# Patient Record
Sex: FEMALE | Race: WHITE | NOT HISPANIC OR LATINO | URBAN - METROPOLITAN AREA
[De-identification: names, ages, dates, MRNs, and addresses within clinical notes are randomized per-mention and may not be internally consistent; named-entity substitution may affect disease eponyms.]

---

## 2024-01-01 ENCOUNTER — INPATIENT (INPATIENT)
Facility: HOSPITAL | Age: 69
LOS: 3 days | Discharge: HOME | DRG: 871 | End: 2024-01-05
Attending: STUDENT IN AN ORGANIZED HEALTH CARE EDUCATION/TRAINING PROGRAM | Admitting: FAMILY MEDICINE
Payer: COMMERCIAL

## 2024-01-01 VITALS
SYSTOLIC BLOOD PRESSURE: 171 MMHG | RESPIRATION RATE: 24 BRPM | OXYGEN SATURATION: 88 % | WEIGHT: 149.91 LBS | TEMPERATURE: 101 F | DIASTOLIC BLOOD PRESSURE: 78 MMHG | HEART RATE: 100 BPM

## 2024-01-01 DIAGNOSIS — Z98.890 OTHER SPECIFIED POSTPROCEDURAL STATES: Chronic | ICD-10-CM

## 2024-01-01 DIAGNOSIS — J96.21 ACUTE AND CHRONIC RESPIRATORY FAILURE WITH HYPOXIA: ICD-10-CM

## 2024-01-01 LAB
ALBUMIN SERPL ELPH-MCNC: 4.2 G/DL — SIGNIFICANT CHANGE UP (ref 3.5–5.2)
ALBUMIN SERPL ELPH-MCNC: 4.2 G/DL — SIGNIFICANT CHANGE UP (ref 3.5–5.2)
ALP SERPL-CCNC: 66 U/L — SIGNIFICANT CHANGE UP (ref 30–115)
ALP SERPL-CCNC: 66 U/L — SIGNIFICANT CHANGE UP (ref 30–115)
ALT FLD-CCNC: 21 U/L — SIGNIFICANT CHANGE UP (ref 0–41)
ALT FLD-CCNC: 21 U/L — SIGNIFICANT CHANGE UP (ref 0–41)
ANION GAP SERPL CALC-SCNC: 12 MMOL/L — SIGNIFICANT CHANGE UP (ref 7–14)
ANION GAP SERPL CALC-SCNC: 12 MMOL/L — SIGNIFICANT CHANGE UP (ref 7–14)
AST SERPL-CCNC: 32 U/L — SIGNIFICANT CHANGE UP (ref 0–41)
AST SERPL-CCNC: 32 U/L — SIGNIFICANT CHANGE UP (ref 0–41)
BASE EXCESS BLDV CALC-SCNC: 4.9 MMOL/L — HIGH (ref -2–3)
BASE EXCESS BLDV CALC-SCNC: 4.9 MMOL/L — HIGH (ref -2–3)
BASOPHILS # BLD AUTO: 0.03 K/UL — SIGNIFICANT CHANGE UP (ref 0–0.2)
BASOPHILS # BLD AUTO: 0.03 K/UL — SIGNIFICANT CHANGE UP (ref 0–0.2)
BASOPHILS NFR BLD AUTO: 0.3 % — SIGNIFICANT CHANGE UP (ref 0–1)
BASOPHILS NFR BLD AUTO: 0.3 % — SIGNIFICANT CHANGE UP (ref 0–1)
BILIRUB SERPL-MCNC: 0.5 MG/DL — SIGNIFICANT CHANGE UP (ref 0.2–1.2)
BILIRUB SERPL-MCNC: 0.5 MG/DL — SIGNIFICANT CHANGE UP (ref 0.2–1.2)
BUN SERPL-MCNC: 37 MG/DL — HIGH (ref 10–20)
BUN SERPL-MCNC: 37 MG/DL — HIGH (ref 10–20)
CA-I SERPL-SCNC: 1.01 MMOL/L — LOW (ref 1.15–1.33)
CA-I SERPL-SCNC: 1.01 MMOL/L — LOW (ref 1.15–1.33)
CALCIUM SERPL-MCNC: 8.3 MG/DL — LOW (ref 8.4–10.5)
CALCIUM SERPL-MCNC: 8.3 MG/DL — LOW (ref 8.4–10.5)
CHLORIDE SERPL-SCNC: 88 MMOL/L — LOW (ref 98–110)
CHLORIDE SERPL-SCNC: 88 MMOL/L — LOW (ref 98–110)
CO2 SERPL-SCNC: 31 MMOL/L — SIGNIFICANT CHANGE UP (ref 17–32)
CO2 SERPL-SCNC: 31 MMOL/L — SIGNIFICANT CHANGE UP (ref 17–32)
CREAT SERPL-MCNC: 0.9 MG/DL — SIGNIFICANT CHANGE UP (ref 0.7–1.5)
CREAT SERPL-MCNC: 0.9 MG/DL — SIGNIFICANT CHANGE UP (ref 0.7–1.5)
EGFR: 69 ML/MIN/1.73M2 — SIGNIFICANT CHANGE UP
EGFR: 69 ML/MIN/1.73M2 — SIGNIFICANT CHANGE UP
EOSINOPHIL # BLD AUTO: 0 K/UL — SIGNIFICANT CHANGE UP (ref 0–0.7)
EOSINOPHIL # BLD AUTO: 0 K/UL — SIGNIFICANT CHANGE UP (ref 0–0.7)
EOSINOPHIL NFR BLD AUTO: 0 % — SIGNIFICANT CHANGE UP (ref 0–8)
EOSINOPHIL NFR BLD AUTO: 0 % — SIGNIFICANT CHANGE UP (ref 0–8)
FLUAV AG NPH QL: DETECTED
FLUAV AG NPH QL: DETECTED
FLUBV AG NPH QL: SIGNIFICANT CHANGE UP
FLUBV AG NPH QL: SIGNIFICANT CHANGE UP
GAS PNL BLDV: 129 MMOL/L — LOW (ref 136–145)
GAS PNL BLDV: 129 MMOL/L — LOW (ref 136–145)
GAS PNL BLDV: SIGNIFICANT CHANGE UP
GAS PNL BLDV: SIGNIFICANT CHANGE UP
GLUCOSE SERPL-MCNC: 106 MG/DL — HIGH (ref 70–99)
GLUCOSE SERPL-MCNC: 106 MG/DL — HIGH (ref 70–99)
HCO3 BLDV-SCNC: 34 MMOL/L — HIGH (ref 22–29)
HCO3 BLDV-SCNC: 34 MMOL/L — HIGH (ref 22–29)
HCT VFR BLD CALC: 44.3 % — SIGNIFICANT CHANGE UP (ref 37–47)
HCT VFR BLD CALC: 44.3 % — SIGNIFICANT CHANGE UP (ref 37–47)
HCT VFR BLDA CALC: 47 % — HIGH (ref 34.5–46.5)
HCT VFR BLDA CALC: 47 % — HIGH (ref 34.5–46.5)
HGB BLD CALC-MCNC: 15.5 G/DL — SIGNIFICANT CHANGE UP (ref 11.7–16.1)
HGB BLD CALC-MCNC: 15.5 G/DL — SIGNIFICANT CHANGE UP (ref 11.7–16.1)
HGB BLD-MCNC: 15.2 G/DL — SIGNIFICANT CHANGE UP (ref 12–16)
HGB BLD-MCNC: 15.2 G/DL — SIGNIFICANT CHANGE UP (ref 12–16)
IMM GRANULOCYTES NFR BLD AUTO: 0.2 % — SIGNIFICANT CHANGE UP (ref 0.1–0.3)
IMM GRANULOCYTES NFR BLD AUTO: 0.2 % — SIGNIFICANT CHANGE UP (ref 0.1–0.3)
LACTATE BLDV-MCNC: 1.5 MMOL/L — SIGNIFICANT CHANGE UP (ref 0.5–2)
LACTATE BLDV-MCNC: 1.5 MMOL/L — SIGNIFICANT CHANGE UP (ref 0.5–2)
LYMPHOCYTES # BLD AUTO: 1.15 K/UL — LOW (ref 1.2–3.4)
LYMPHOCYTES # BLD AUTO: 1.15 K/UL — LOW (ref 1.2–3.4)
LYMPHOCYTES # BLD AUTO: 13.3 % — LOW (ref 20.5–51.1)
LYMPHOCYTES # BLD AUTO: 13.3 % — LOW (ref 20.5–51.1)
MCHC RBC-ENTMCNC: 28.6 PG — SIGNIFICANT CHANGE UP (ref 27–31)
MCHC RBC-ENTMCNC: 28.6 PG — SIGNIFICANT CHANGE UP (ref 27–31)
MCHC RBC-ENTMCNC: 34.3 G/DL — SIGNIFICANT CHANGE UP (ref 32–37)
MCHC RBC-ENTMCNC: 34.3 G/DL — SIGNIFICANT CHANGE UP (ref 32–37)
MCV RBC AUTO: 83.3 FL — SIGNIFICANT CHANGE UP (ref 81–99)
MCV RBC AUTO: 83.3 FL — SIGNIFICANT CHANGE UP (ref 81–99)
MONOCYTES # BLD AUTO: 1.08 K/UL — HIGH (ref 0.1–0.6)
MONOCYTES # BLD AUTO: 1.08 K/UL — HIGH (ref 0.1–0.6)
MONOCYTES NFR BLD AUTO: 12.5 % — HIGH (ref 1.7–9.3)
MONOCYTES NFR BLD AUTO: 12.5 % — HIGH (ref 1.7–9.3)
NEUTROPHILS # BLD AUTO: 6.35 K/UL — SIGNIFICANT CHANGE UP (ref 1.4–6.5)
NEUTROPHILS # BLD AUTO: 6.35 K/UL — SIGNIFICANT CHANGE UP (ref 1.4–6.5)
NEUTROPHILS NFR BLD AUTO: 73.7 % — SIGNIFICANT CHANGE UP (ref 42.2–75.2)
NEUTROPHILS NFR BLD AUTO: 73.7 % — SIGNIFICANT CHANGE UP (ref 42.2–75.2)
NRBC # BLD: 0 /100 WBCS — SIGNIFICANT CHANGE UP (ref 0–0)
NRBC # BLD: 0 /100 WBCS — SIGNIFICANT CHANGE UP (ref 0–0)
PCO2 BLDV: 69 MMHG — HIGH (ref 39–42)
PCO2 BLDV: 69 MMHG — HIGH (ref 39–42)
PH BLDV: 7.3 — LOW (ref 7.32–7.43)
PH BLDV: 7.3 — LOW (ref 7.32–7.43)
PLATELET # BLD AUTO: 201 K/UL — SIGNIFICANT CHANGE UP (ref 130–400)
PLATELET # BLD AUTO: 201 K/UL — SIGNIFICANT CHANGE UP (ref 130–400)
PMV BLD: 10.4 FL — SIGNIFICANT CHANGE UP (ref 7.4–10.4)
PMV BLD: 10.4 FL — SIGNIFICANT CHANGE UP (ref 7.4–10.4)
PO2 BLDV: 66 MMHG — HIGH (ref 25–45)
PO2 BLDV: 66 MMHG — HIGH (ref 25–45)
POTASSIUM BLDV-SCNC: 4.9 MMOL/L — SIGNIFICANT CHANGE UP (ref 3.5–5.1)
POTASSIUM BLDV-SCNC: 4.9 MMOL/L — SIGNIFICANT CHANGE UP (ref 3.5–5.1)
POTASSIUM SERPL-MCNC: 4.6 MMOL/L — SIGNIFICANT CHANGE UP (ref 3.5–5)
POTASSIUM SERPL-MCNC: 4.6 MMOL/L — SIGNIFICANT CHANGE UP (ref 3.5–5)
POTASSIUM SERPL-SCNC: 4.6 MMOL/L — SIGNIFICANT CHANGE UP (ref 3.5–5)
POTASSIUM SERPL-SCNC: 4.6 MMOL/L — SIGNIFICANT CHANGE UP (ref 3.5–5)
PROT SERPL-MCNC: 7 G/DL — SIGNIFICANT CHANGE UP (ref 6–8)
PROT SERPL-MCNC: 7 G/DL — SIGNIFICANT CHANGE UP (ref 6–8)
RBC # BLD: 5.32 M/UL — SIGNIFICANT CHANGE UP (ref 4.2–5.4)
RBC # BLD: 5.32 M/UL — SIGNIFICANT CHANGE UP (ref 4.2–5.4)
RBC # FLD: 13.1 % — SIGNIFICANT CHANGE UP (ref 11.5–14.5)
RBC # FLD: 13.1 % — SIGNIFICANT CHANGE UP (ref 11.5–14.5)
RSV RNA NPH QL NAA+NON-PROBE: SIGNIFICANT CHANGE UP
RSV RNA NPH QL NAA+NON-PROBE: SIGNIFICANT CHANGE UP
SAO2 % BLDV: 90.9 % — HIGH (ref 67–88)
SAO2 % BLDV: 90.9 % — HIGH (ref 67–88)
SARS-COV-2 RNA SPEC QL NAA+PROBE: SIGNIFICANT CHANGE UP
SARS-COV-2 RNA SPEC QL NAA+PROBE: SIGNIFICANT CHANGE UP
SODIUM SERPL-SCNC: 131 MMOL/L — LOW (ref 135–146)
SODIUM SERPL-SCNC: 131 MMOL/L — LOW (ref 135–146)
WBC # BLD: 8.63 K/UL — SIGNIFICANT CHANGE UP (ref 4.8–10.8)
WBC # BLD: 8.63 K/UL — SIGNIFICANT CHANGE UP (ref 4.8–10.8)
WBC # FLD AUTO: 8.63 K/UL — SIGNIFICANT CHANGE UP (ref 4.8–10.8)
WBC # FLD AUTO: 8.63 K/UL — SIGNIFICANT CHANGE UP (ref 4.8–10.8)

## 2024-01-01 PROCEDURE — 85027 COMPLETE CBC AUTOMATED: CPT

## 2024-01-01 PROCEDURE — 99285 EMERGENCY DEPT VISIT HI MDM: CPT

## 2024-01-01 PROCEDURE — 94640 AIRWAY INHALATION TREATMENT: CPT

## 2024-01-01 PROCEDURE — 36415 COLL VENOUS BLD VENIPUNCTURE: CPT

## 2024-01-01 PROCEDURE — 84100 ASSAY OF PHOSPHORUS: CPT

## 2024-01-01 PROCEDURE — 83735 ASSAY OF MAGNESIUM: CPT

## 2024-01-01 PROCEDURE — 80053 COMPREHEN METABOLIC PANEL: CPT

## 2024-01-01 PROCEDURE — 86803 HEPATITIS C AB TEST: CPT

## 2024-01-01 PROCEDURE — 82962 GLUCOSE BLOOD TEST: CPT

## 2024-01-01 PROCEDURE — 71045 X-RAY EXAM CHEST 1 VIEW: CPT | Mod: 26

## 2024-01-01 PROCEDURE — 94660 CPAP INITIATION&MGMT: CPT

## 2024-01-01 PROCEDURE — 82803 BLOOD GASES ANY COMBINATION: CPT

## 2024-01-01 PROCEDURE — 83605 ASSAY OF LACTIC ACID: CPT

## 2024-01-01 PROCEDURE — 99222 1ST HOSP IP/OBS MODERATE 55: CPT

## 2024-01-01 PROCEDURE — 80048 BASIC METABOLIC PNL TOTAL CA: CPT

## 2024-01-01 PROCEDURE — 85025 COMPLETE CBC W/AUTO DIFF WBC: CPT

## 2024-01-01 PROCEDURE — 71275 CT ANGIOGRAPHY CHEST: CPT

## 2024-01-01 PROCEDURE — 94760 N-INVAS EAR/PLS OXIMETRY 1: CPT

## 2024-01-01 PROCEDURE — 84145 PROCALCITONIN (PCT): CPT

## 2024-01-01 RX ORDER — ONDANSETRON 8 MG/1
4 TABLET, FILM COATED ORAL EVERY 6 HOURS
Refills: 0 | Status: DISCONTINUED | OUTPATIENT
Start: 2024-01-01 | End: 2024-01-05

## 2024-01-01 RX ORDER — IPRATROPIUM/ALBUTEROL SULFATE 18-103MCG
3 AEROSOL WITH ADAPTER (GRAM) INHALATION EVERY 6 HOURS
Refills: 0 | Status: DISCONTINUED | OUTPATIENT
Start: 2024-01-01 | End: 2024-01-05

## 2024-01-01 RX ORDER — ACETAMINOPHEN 500 MG
975 TABLET ORAL ONCE
Refills: 0 | Status: COMPLETED | OUTPATIENT
Start: 2024-01-01 | End: 2024-01-01

## 2024-01-01 RX ORDER — ACETAMINOPHEN 500 MG
650 TABLET ORAL EVERY 6 HOURS
Refills: 0 | Status: DISCONTINUED | OUTPATIENT
Start: 2024-01-01 | End: 2024-01-05

## 2024-01-01 RX ORDER — IPRATROPIUM/ALBUTEROL SULFATE 18-103MCG
3 AEROSOL WITH ADAPTER (GRAM) INHALATION
Refills: 0 | Status: COMPLETED | OUTPATIENT
Start: 2024-01-01 | End: 2024-01-01

## 2024-01-01 RX ORDER — BUDESONIDE AND FORMOTEROL FUMARATE DIHYDRATE 160; 4.5 UG/1; UG/1
2 AEROSOL RESPIRATORY (INHALATION)
Refills: 0 | Status: DISCONTINUED | OUTPATIENT
Start: 2024-01-01 | End: 2024-01-05

## 2024-01-01 RX ORDER — HYDROCORTISONE 20 MG
40 TABLET ORAL EVERY 8 HOURS
Refills: 0 | Status: DISCONTINUED | OUTPATIENT
Start: 2024-01-01 | End: 2024-01-01

## 2024-01-01 RX ORDER — CEFTRIAXONE 500 MG/1
1000 INJECTION, POWDER, FOR SOLUTION INTRAMUSCULAR; INTRAVENOUS EVERY 24 HOURS
Refills: 0 | Status: DISCONTINUED | OUTPATIENT
Start: 2024-01-01 | End: 2024-01-02

## 2024-01-01 RX ORDER — NICOTINE POLACRILEX 2 MG
21 GUM BUCCAL DAILY
Refills: 0 | Status: DISCONTINUED | OUTPATIENT
Start: 2024-01-01 | End: 2024-01-05

## 2024-01-01 RX ORDER — PANTOPRAZOLE SODIUM 20 MG/1
40 TABLET, DELAYED RELEASE ORAL
Refills: 0 | Status: DISCONTINUED | OUTPATIENT
Start: 2024-01-01 | End: 2024-01-05

## 2024-01-01 RX ORDER — CEFTRIAXONE 500 MG/1
1000 INJECTION, POWDER, FOR SOLUTION INTRAMUSCULAR; INTRAVENOUS ONCE
Refills: 0 | Status: COMPLETED | OUTPATIENT
Start: 2024-01-01 | End: 2024-01-01

## 2024-01-01 RX ORDER — HEPARIN SODIUM 5000 [USP'U]/ML
5000 INJECTION INTRAVENOUS; SUBCUTANEOUS EVERY 12 HOURS
Refills: 0 | Status: DISCONTINUED | OUTPATIENT
Start: 2024-01-01 | End: 2024-01-02

## 2024-01-01 RX ORDER — LACTOBACILLUS ACIDOPHILUS 100MM CELL
1 CAPSULE ORAL
Refills: 0 | Status: DISCONTINUED | OUTPATIENT
Start: 2024-01-01 | End: 2024-01-05

## 2024-01-01 RX ORDER — SODIUM CHLORIDE 9 MG/ML
1000 INJECTION INTRAMUSCULAR; INTRAVENOUS; SUBCUTANEOUS ONCE
Refills: 0 | Status: COMPLETED | OUTPATIENT
Start: 2024-01-01 | End: 2024-01-01

## 2024-01-01 RX ORDER — ALBUTEROL 90 UG/1
2 AEROSOL, METERED ORAL EVERY 4 HOURS
Refills: 0 | Status: DISCONTINUED | OUTPATIENT
Start: 2024-01-01 | End: 2024-01-05

## 2024-01-01 RX ORDER — SENNA PLUS 8.6 MG/1
2 TABLET ORAL AT BEDTIME
Refills: 0 | Status: DISCONTINUED | OUTPATIENT
Start: 2024-01-01 | End: 2024-01-05

## 2024-01-01 RX ORDER — AZITHROMYCIN 500 MG/1
500 TABLET, FILM COATED ORAL ONCE
Refills: 0 | Status: COMPLETED | OUTPATIENT
Start: 2024-01-01 | End: 2024-01-01

## 2024-01-01 RX ORDER — AZITHROMYCIN 500 MG/1
500 TABLET, FILM COATED ORAL EVERY 24 HOURS
Refills: 0 | Status: DISCONTINUED | OUTPATIENT
Start: 2024-01-01 | End: 2024-01-04

## 2024-01-01 RX ADMIN — CEFTRIAXONE 100 MILLIGRAM(S): 500 INJECTION, POWDER, FOR SOLUTION INTRAMUSCULAR; INTRAVENOUS at 21:02

## 2024-01-01 RX ADMIN — Medication 3 MILLILITER(S): at 22:39

## 2024-01-01 RX ADMIN — SODIUM CHLORIDE 1000 MILLILITER(S): 9 INJECTION INTRAMUSCULAR; INTRAVENOUS; SUBCUTANEOUS at 21:00

## 2024-01-01 RX ADMIN — Medication 3 MILLILITER(S): at 22:11

## 2024-01-01 RX ADMIN — Medication 975 MILLIGRAM(S): at 21:00

## 2024-01-01 RX ADMIN — Medication 75 MILLIGRAM(S): at 23:58

## 2024-01-01 RX ADMIN — Medication 3 MILLILITER(S): at 21:03

## 2024-01-01 RX ADMIN — Medication 125 MILLIGRAM(S): at 21:02

## 2024-01-01 RX ADMIN — AZITHROMYCIN 255 MILLIGRAM(S): 500 TABLET, FILM COATED ORAL at 21:03

## 2024-01-01 NOTE — ED PROVIDER NOTE - NS ED ATTENDING STATEMENT MOD
This was a shared visit with the DIXIE. I reviewed and verified the documentation and independently performed the documented: This was a shared visit with the DIXIE. I reviewed and verified the documentation.

## 2024-01-01 NOTE — ED PROVIDER NOTE - CARE PLAN
1 Principal Discharge DX:	Acute on chronic respiratory failure with hypoxia and hypercapnia  Secondary Diagnosis:	COPD exacerbation  Secondary Diagnosis:	Influenza A

## 2024-01-01 NOTE — ED PROVIDER NOTE - NS_EDPROVIDERDISPOUSERTYPE_ED_A_ED
In an effort to ensure that our patients [facility], a Team Member has reviewed your chart and identified an opportunity to provide the best care possible. An attempt was made to discuss or schedule overdue Preventive or Disease Management screening.     The Outcome was Contact was not made, left messageIf you have any questions or need help with scheduling, contact our Health Outreach Team at [phone]. Care Gaps include Wellness Visits.       Attending Attestation (For Attendings USE Only)...

## 2024-01-01 NOTE — H&P ADULT - NSHPPHYSICALEXAM_GEN_ALL_CORE
Vital Signs Last 24 Hrs  T(C): 36.8 (01 Jan 2024 22:46), Max: 38.5 (01 Jan 2024 19:44)  T(F): 98.3 (01 Jan 2024 22:46), Max: 101.3 (01 Jan 2024 19:44)  HR: 90 (01 Jan 2024 22:46) (90 - 100)  BP: 117/56 (01 Jan 2024 22:46) (117/56 - 171/78)  BP(mean): --  RR: 22 (01 Jan 2024 22:12) (22 - 24)  SpO2: 94% (01 Jan 2024 22:46) (88% - 96%)    Parameters below as of 01 Jan 2024 22:46  Patient On (Oxygen Delivery Method): nasal cannula  O2 Flow (L/min): 4    PHYSICAL EXAM-  GENERAL: NAD,    HEAD:  Atraumatic, Normocephalic  EYES: EOMI, PERRLA, conjunctiva and sclera clear  NECK: Supple, No JVD, Normal thyroid  NERVOUS SYSTEM:  Alert & Oriented X3, Motor Strength 5/5 B/L upper and lower extremities; DTRs 2+ intact and symmetric  CHEST/LUNG: + rhonchi with wheezing with decrease air entry   HEART: Regular rate and rhythm; No murmurs, rubs, or gallops  ABDOMEN: Soft, Nontender, Nondistended; Bowel sounds present  EXTREMITIES:  2+ Peripheral Pulses, No clubbing, cyanosis, or edema  SKIN: No rashes or lesions

## 2024-01-01 NOTE — H&P ADULT - ASSESSMENT
Patient is 67 yo female with long term smoking, has not seen a physician for many years presenting with       #COPD exacerbation  #Flu  #Tob usage  #recent travel  -CXR shows possible infiltrate  #acute hypoxic, and mild hypercarbic respiratory failure  -Will start patient on tamiflu, duoneb Advair neb tx, albuterol inhaler PRN, IV antibiotic, and IV steriod  -Chest CTA to r/o pe   -Nicotine patch  -Pulmonary, and ID consult  -Counselled about smoking  -MRSA, legionella, and strep antigen  -Oxygen  -Counselled      #Progress Note Handoff  Pending (specify):  as above   Family discussion:  plan of care was discussed with patient  in details.  all questions were answered.  seems to understand, and in agreement  Disposition:  Home    Patient is 69 yo female with long term smoking, has not seen a physician for many years presenting with       #COPD exacerbation  #Flu  #Tob usage  #recent travel  -CXR shows possible infiltrate  #acute hypoxic, and mild hypercarbic respiratory failure  -Will start patient on tamiflu, duoneb Advair neb tx, albuterol inhaler PRN, IV antibiotic, and IV steriod  -Chest CTA to r/o pe   -Nicotine patch  -Pulmonary, and ID consult  -Counselled about smoking  -MRSA, legionella, and strep antigen  -Oxygen  -Counselled      #Progress Note Handoff  Pending (specify):  as above   Family discussion:  plan of care was discussed with patient  in details.  all questions were answered.  seems to understand, and in agreement  Disposition:  Home

## 2024-01-01 NOTE — ED PROVIDER NOTE - OBJECTIVE STATEMENT
68 years old female history of heavy smoking presents complaint of worsening shortness of breath and coughing over the past week.  Reports her entire family was sick during [place] celebration.  She started having coughing and body aches with fever chills over the past week.  Having more shortness of breath tonight so son called EMS.  Patient otherwise denies headache, dizziness, chest pain, abdominal pain, vomiting, diarrhea or urinary symptoms.    Reports she have not seen a doctor over this through the years.

## 2024-01-01 NOTE — ED PROVIDER NOTE - CLINICAL SUMMARY MEDICAL DECISION MAKING FREE TEXT BOX
68yF  no pmhx Does not follow-up with primary doctor in over 10 years positive smoker presents with fever cough abdominal positive sick contacts with allergies worsening shortness of breath.  Family son at bedside notes pursed breathing and wheezing x 2 years however worse since yesterday 68yF  no pmhx Does not follow-up with primary doctor in over 10 years positive smoker presents with fever cough abdominal positive sick contacts with allergies worsening shortness of breath.  Family son at bedside notes pursed breathing and wheezing x 2 years however worse since yesterday.  in ED  wheezing  bl  hypoxic   nebs steroids  given ,  and  cap  abx   Pt  found to  be Flu A positive   doing well on NC.  admitted

## 2024-01-01 NOTE — ED PROVIDER NOTE - PHYSICAL EXAMINATION
CONSTITUTIONAL: Well-appearing; in no apparent distress.   EYES: PERRL; EOM intact.   CARDIOVASCULAR: Normal S1, S2; no murmurs, rubs, or gallops.   RESPIRATORY: Respiratory rate 22.  Mild accessory muscle use.  Speaking 2-3 word sentences.  Bilateral expiratory wheezing with decreased breath sound.  GI/: Normal bowel sounds; non-distended; non-tender; no palpable organomegaly.   MS: No calf swelling and tenderness.  No pitting edema to lower extremities.  SKIN: Normal for age and race; warm; dry; good turgor; no apparent lesions or exudate.   NEURO/PSYCH: A & O x 4; grossly unremarkable.

## 2024-01-01 NOTE — ED ADULT NURSE NOTE - NSFALLUNIVINTERV_ED_ALL_ED
Bed/Stretcher in lowest position, wheels locked, appropriate side rails in place/Call bell, personal items and telephone in reach/Instruct patient to call for assistance before getting out of bed/chair/stretcher/Non-slip footwear applied when patient is off stretcher/[place] to call system/Physically safe environment - no spills, clutter or unnecessary equipment/Purposeful proactive rounding/Room/bathroom lighting operational, light cord in reach Bed/Stretcher in lowest position, wheels locked, appropriate side rails in place/Call bell, personal items and telephone in reach/Instruct patient to call for assistance before getting out of bed/chair/stretcher/Non-slip footwear applied when patient is off stretcher/[place] to call system/Physically safe environment - no spills, clutter or unnecessary equipment/Purposeful proactive rounding/Room/bathroom lighting operational, light cord in reach

## 2024-01-01 NOTE — ED ADULT TRIAGE NOTE - CHIEF COMPLAINT QUOTE
pt complaining of difficulty breathing since yesterday   82% on room air as per ems   pts family members sick

## 2024-01-01 NOTE — H&P ADULT - HISTORY OF PRESENT ILLNESS
Patient is 69 yo female who has not seen a doctor for many years presenting with SOB, cough that has been going on for the past several days.  recently came back from [place].  denies any chest pain, palpitation, abdm pain, N/V/D, headache, dizziness, blurry vision, numbness or weakness

## 2024-01-01 NOTE — ED ADULT NURSE NOTE - NSSEPSISCRITERIAMET_ED_A_ED
Pt requested order for mammogram. Mammogram orders have been placed as requested. Called for pt no answer, left message on voicemail to return call.   Abnormal VS & WBC Abnormal VS & WBC/Abnormal Lactate

## 2024-01-02 LAB
ALBUMIN SERPL ELPH-MCNC: 4.1 G/DL — SIGNIFICANT CHANGE UP (ref 3.5–5.2)
ALBUMIN SERPL ELPH-MCNC: 4.1 G/DL — SIGNIFICANT CHANGE UP (ref 3.5–5.2)
ALP SERPL-CCNC: 62 U/L — SIGNIFICANT CHANGE UP (ref 30–115)
ALP SERPL-CCNC: 62 U/L — SIGNIFICANT CHANGE UP (ref 30–115)
ALT FLD-CCNC: 19 U/L — SIGNIFICANT CHANGE UP (ref 0–41)
ALT FLD-CCNC: 19 U/L — SIGNIFICANT CHANGE UP (ref 0–41)
ANION GAP SERPL CALC-SCNC: 10 MMOL/L — SIGNIFICANT CHANGE UP (ref 7–14)
ANION GAP SERPL CALC-SCNC: 10 MMOL/L — SIGNIFICANT CHANGE UP (ref 7–14)
AST SERPL-CCNC: 25 U/L — SIGNIFICANT CHANGE UP (ref 0–41)
AST SERPL-CCNC: 25 U/L — SIGNIFICANT CHANGE UP (ref 0–41)
BASE EXCESS BLDA CALC-SCNC: 7.7 MMOL/L — HIGH (ref -2–3)
BASE EXCESS BLDA CALC-SCNC: 7.7 MMOL/L — HIGH (ref -2–3)
BILIRUB SERPL-MCNC: 0.3 MG/DL — SIGNIFICANT CHANGE UP (ref 0.2–1.2)
BILIRUB SERPL-MCNC: 0.3 MG/DL — SIGNIFICANT CHANGE UP (ref 0.2–1.2)
BUN SERPL-MCNC: 34 MG/DL — HIGH (ref 10–20)
BUN SERPL-MCNC: 34 MG/DL — HIGH (ref 10–20)
CALCIUM SERPL-MCNC: 8 MG/DL — LOW (ref 8.4–10.5)
CALCIUM SERPL-MCNC: 8 MG/DL — LOW (ref 8.4–10.5)
CHLORIDE SERPL-SCNC: 91 MMOL/L — LOW (ref 98–110)
CHLORIDE SERPL-SCNC: 91 MMOL/L — LOW (ref 98–110)
CO2 SERPL-SCNC: 30 MMOL/L — SIGNIFICANT CHANGE UP (ref 17–32)
CO2 SERPL-SCNC: 30 MMOL/L — SIGNIFICANT CHANGE UP (ref 17–32)
CREAT SERPL-MCNC: 0.9 MG/DL — SIGNIFICANT CHANGE UP (ref 0.7–1.5)
CREAT SERPL-MCNC: 0.9 MG/DL — SIGNIFICANT CHANGE UP (ref 0.7–1.5)
EGFR: 69 ML/MIN/1.73M2 — SIGNIFICANT CHANGE UP
EGFR: 69 ML/MIN/1.73M2 — SIGNIFICANT CHANGE UP
GLUCOSE BLDC GLUCOMTR-MCNC: 127 MG/DL — HIGH (ref 70–99)
GLUCOSE BLDC GLUCOMTR-MCNC: 127 MG/DL — HIGH (ref 70–99)
GLUCOSE SERPL-MCNC: 153 MG/DL — HIGH (ref 70–99)
GLUCOSE SERPL-MCNC: 153 MG/DL — HIGH (ref 70–99)
HCO3 BLDA-SCNC: 37 MMOL/L — HIGH (ref 21–28)
HCO3 BLDA-SCNC: 37 MMOL/L — HIGH (ref 21–28)
HCT VFR BLD CALC: 43.6 % — SIGNIFICANT CHANGE UP (ref 37–47)
HCT VFR BLD CALC: 43.6 % — SIGNIFICANT CHANGE UP (ref 37–47)
HCV AB S/CO SERPL IA: 0.04 COI — SIGNIFICANT CHANGE UP
HCV AB S/CO SERPL IA: 0.04 COI — SIGNIFICANT CHANGE UP
HCV AB SERPL-IMP: SIGNIFICANT CHANGE UP
HCV AB SERPL-IMP: SIGNIFICANT CHANGE UP
HGB BLD-MCNC: 14.6 G/DL — SIGNIFICANT CHANGE UP (ref 12–16)
HGB BLD-MCNC: 14.6 G/DL — SIGNIFICANT CHANGE UP (ref 12–16)
LACTATE SERPL-SCNC: 3.6 MMOL/L — HIGH (ref 0.7–2)
LACTATE SERPL-SCNC: 3.6 MMOL/L — HIGH (ref 0.7–2)
MCHC RBC-ENTMCNC: 28.2 PG — SIGNIFICANT CHANGE UP (ref 27–31)
MCHC RBC-ENTMCNC: 28.2 PG — SIGNIFICANT CHANGE UP (ref 27–31)
MCHC RBC-ENTMCNC: 33.5 G/DL — SIGNIFICANT CHANGE UP (ref 32–37)
MCHC RBC-ENTMCNC: 33.5 G/DL — SIGNIFICANT CHANGE UP (ref 32–37)
MCV RBC AUTO: 84.3 FL — SIGNIFICANT CHANGE UP (ref 81–99)
MCV RBC AUTO: 84.3 FL — SIGNIFICANT CHANGE UP (ref 81–99)
NRBC # BLD: 0 /100 WBCS — SIGNIFICANT CHANGE UP (ref 0–0)
NRBC # BLD: 0 /100 WBCS — SIGNIFICANT CHANGE UP (ref 0–0)
PCO2 BLDA: 73 MMHG — CRITICAL HIGH (ref 32–45)
PCO2 BLDA: 73 MMHG — CRITICAL HIGH (ref 32–45)
PH BLDA: 7.31 — LOW (ref 7.35–7.45)
PH BLDA: 7.31 — LOW (ref 7.35–7.45)
PLATELET # BLD AUTO: 157 K/UL — SIGNIFICANT CHANGE UP (ref 130–400)
PLATELET # BLD AUTO: 157 K/UL — SIGNIFICANT CHANGE UP (ref 130–400)
PMV BLD: 10 FL — SIGNIFICANT CHANGE UP (ref 7.4–10.4)
PMV BLD: 10 FL — SIGNIFICANT CHANGE UP (ref 7.4–10.4)
PO2 BLDA: 50 MMHG — CRITICAL LOW (ref 83–108)
PO2 BLDA: 50 MMHG — CRITICAL LOW (ref 83–108)
POTASSIUM SERPL-MCNC: 4.8 MMOL/L — SIGNIFICANT CHANGE UP (ref 3.5–5)
POTASSIUM SERPL-MCNC: 4.8 MMOL/L — SIGNIFICANT CHANGE UP (ref 3.5–5)
POTASSIUM SERPL-SCNC: 4.8 MMOL/L — SIGNIFICANT CHANGE UP (ref 3.5–5)
POTASSIUM SERPL-SCNC: 4.8 MMOL/L — SIGNIFICANT CHANGE UP (ref 3.5–5)
PROT SERPL-MCNC: 6.5 G/DL — SIGNIFICANT CHANGE UP (ref 6–8)
PROT SERPL-MCNC: 6.5 G/DL — SIGNIFICANT CHANGE UP (ref 6–8)
RBC # BLD: 5.17 M/UL — SIGNIFICANT CHANGE UP (ref 4.2–5.4)
RBC # BLD: 5.17 M/UL — SIGNIFICANT CHANGE UP (ref 4.2–5.4)
RBC # FLD: 13.3 % — SIGNIFICANT CHANGE UP (ref 11.5–14.5)
RBC # FLD: 13.3 % — SIGNIFICANT CHANGE UP (ref 11.5–14.5)
SAO2 % BLDA: 84.9 % — LOW (ref 94–98)
SAO2 % BLDA: 84.9 % — LOW (ref 94–98)
SODIUM SERPL-SCNC: 131 MMOL/L — LOW (ref 135–146)
SODIUM SERPL-SCNC: 131 MMOL/L — LOW (ref 135–146)
WBC # BLD: 6.13 K/UL — SIGNIFICANT CHANGE UP (ref 4.8–10.8)
WBC # BLD: 6.13 K/UL — SIGNIFICANT CHANGE UP (ref 4.8–10.8)
WBC # FLD AUTO: 6.13 K/UL — SIGNIFICANT CHANGE UP (ref 4.8–10.8)
WBC # FLD AUTO: 6.13 K/UL — SIGNIFICANT CHANGE UP (ref 4.8–10.8)

## 2024-01-02 PROCEDURE — 99223 1ST HOSP IP/OBS HIGH 75: CPT

## 2024-01-02 PROCEDURE — 71275 CT ANGIOGRAPHY CHEST: CPT | Mod: 26

## 2024-01-02 PROCEDURE — 99232 SBSQ HOSP IP/OBS MODERATE 35: CPT

## 2024-01-02 RX ORDER — ENOXAPARIN SODIUM 100 MG/ML
40 INJECTION SUBCUTANEOUS EVERY 24 HOURS
Refills: 0 | Status: DISCONTINUED | OUTPATIENT
Start: 2024-01-02 | End: 2024-01-05

## 2024-01-02 RX ADMIN — Medication 3 MILLILITER(S): at 15:23

## 2024-01-02 RX ADMIN — BUDESONIDE AND FORMOTEROL FUMARATE DIHYDRATE 2 PUFF(S): 160; 4.5 AEROSOL RESPIRATORY (INHALATION) at 21:00

## 2024-01-02 RX ADMIN — Medication 40 MILLIGRAM(S): at 15:24

## 2024-01-02 RX ADMIN — Medication 3 MILLILITER(S): at 02:30

## 2024-01-02 RX ADMIN — Medication 75 MILLIGRAM(S): at 18:32

## 2024-01-02 RX ADMIN — Medication 75 MILLIGRAM(S): at 06:29

## 2024-01-02 RX ADMIN — Medication 3 MILLILITER(S): at 20:39

## 2024-01-02 RX ADMIN — PANTOPRAZOLE SODIUM 40 MILLIGRAM(S): 20 TABLET, DELAYED RELEASE ORAL at 06:30

## 2024-01-02 RX ADMIN — Medication 40 MILLIGRAM(S): at 06:29

## 2024-01-02 RX ADMIN — HEPARIN SODIUM 5000 UNIT(S): 5000 INJECTION INTRAVENOUS; SUBCUTANEOUS at 06:29

## 2024-01-02 RX ADMIN — Medication 1 PATCH: at 16:08

## 2024-01-02 RX ADMIN — Medication 40 MILLIGRAM(S): at 21:17

## 2024-01-02 RX ADMIN — AZITHROMYCIN 255 MILLIGRAM(S): 500 TABLET, FILM COATED ORAL at 21:17

## 2024-01-02 RX ADMIN — ENOXAPARIN SODIUM 40 MILLIGRAM(S): 100 INJECTION SUBCUTANEOUS at 21:17

## 2024-01-02 RX ADMIN — Medication 3 MILLILITER(S): at 09:24

## 2024-01-02 RX ADMIN — Medication 1 TABLET(S): at 17:00

## 2024-01-02 NOTE — CONSULT NOTE ADULT - ASSESSMENT
Impression:  Acute chronic COPD with exacerbation  no pneumonia   viral bronchitis tracheitis from  Influenzae A  several tiny pulm nodules      Check O2 sat on NC, record, continue O2 as necessary to maintain sats > 90%  Continue IV solumedrol   bronchodilator treatments ATC and PRN  doubt need for antibiotics other than  Tamiflu  NIPPV as needed  OOB to chair  GI and DVT prophylaxis  pulmonary toilet  Monitor clinically, convert to oral prednisone as clinical improvement allows  Upon D/C the patient will need ICS/LABA, LAMA, PRN albuterol, finish abx, slow taper of steroids ie. start Prednisone 40 mg and taper 10 mg Q4d.    Repeat CT 12 months to document stability of nodules pt aware

## 2024-01-02 NOTE — CONSULT NOTE ADULT - ASSESSMENT
68 years old female who has not seen a doctor for many years presenting with SOB, cough.    ID is consulted for influenza and bronchitis  Febrile to 101.3 on admission, with no leukocytosis  Hypoxemia requiring BiPAP  Influenza A +  CTA chest showed mild diffuse central bronchial thickening noted likely bronchitis.    Antibiotics:  Ceftriaxone 1/1 - 1/2  Azithromycin 1/1 ->  Tamiflu 1/1 ->      IMPRESSION:  Acute bronchitis  Acute COPD exacerbation  Influenza A  Acute hypoxemic respiratory failure    RECOMMENDATIONS:  - D/C ceftriaxone, continue IV azithromycin 500mg q24hrs  - Continue PO Tamiflu 75mg q12hrs x 5 days total  - Continue Duonebs and steroid as per primary team  - Aspiration precaution  - Trend WBC, fever curve, transaminases, creatinine daily      Marifer Damon D.O.  Attending Physician  Division of Infectious Diseases  [facility]  Please contact me via [employer] Teams   68 years old female who has not seen a doctor for many years presenting with SOB, cough.    ID is consulted for influenza and bronchitis  Febrile to 101.3 on admission, with no leukocytosis  Hypoxemia requiring BiPAP  Influenza A +  CTA chest showed mild diffuse central bronchial thickening noted likely bronchitis.    Antibiotics:  Ceftriaxone 1/1 - 1/2  Azithromycin 1/1 ->  Tamiflu 1/1 ->      IMPRESSION:  Acute bronchitis  Acute COPD exacerbation  Influenza A  Acute hypoxemic respiratory failure    RECOMMENDATIONS:  - D/C ceftriaxone, continue IV azithromycin 500mg q24hrs  - Continue PO Tamiflu 75mg q12hrs x 5 days total  - Continue Duonebs and steroid as per primary team  - Aspiration precaution  - Trend WBC, fever curve, transaminases, creatinine daily      Marifer Damon D.O.  Attending Physician  Division of Infectious Diseases  [facility]  Please contact me via [employer] Teams

## 2024-01-02 NOTE — PROGRESS NOTE ADULT - SUBJECTIVE AND OBJECTIVE BOX
Patient is a 68y old  Female who presents with a chief complaint of SOB/Cough (02 Jan 2024 10:11)      MEDICATIONS  (STANDING):  albuterol/ipratropium for Nebulization 3 milliLiter(s) Nebulizer every 6 hours  azithromycin  IVPB 500 milliGRAM(s) IV Intermittent every 24 hours  budesonide 160 MICROgram(s)/formoterol 4.5 MICROgram(s) Inhaler 2 Puff(s) Inhalation two times a day  cefTRIAXone   IVPB 1000 milliGRAM(s) IV Intermittent every 24 hours  heparin   Injectable 5000 Unit(s) SubCutaneous every 12 hours  lactobacillus acidophilus 1 Tablet(s) Oral three times a day with meals  methylPREDNISolone sodium succinate Injectable 40 milliGRAM(s) IV Push every 8 hours  nicotine - 21 mG/24Hr(s) Patch 21 Patch Transdermal daily  oseltamivir 75 milliGRAM(s) Oral two times a day  pantoprazole    Tablet 40 milliGRAM(s) Oral before breakfast    MEDICATIONS  (PRN):  acetaminophen     Tablet .. 650 milliGRAM(s) Oral every 6 hours PRN Temp greater or equal to 38C (100.4F), Mild Pain (1 - 3)  albuterol    90 MICROgram(s) HFA Inhaler 2 Puff(s) Inhalation every 4 hours PRN Shortness of Breath and/or Wheezing  ondansetron Injectable 4 milliGRAM(s) IV Push every 6 hours PRN Nausea  senna 2 Tablet(s) Oral at bedtime PRN Constipation      CAPILLARY BLOOD GLUCOSE  POCT Blood Glucose.: 127 mg/dL (02 Jan 2024 09:19)      PHYSICAL EXAM:  Vital Signs Last 24 Hrs  T(C): 36.2 (02 Jan 2024 07:29), Max: 38.5 (01 Jan 2024 19:44)  T(F): 97.1 (02 Jan 2024 07:29), Max: 101.3 (01 Jan 2024 19:44)  HR: 72 (02 Jan 2024 11:38) (72 - 100)  BP: 119/66 (02 Jan 2024 11:38) (117/56 - 171/78)  BP(mean): --  RR: 21 (02 Jan 2024 11:38) (14 - 24)  SpO2: 97% (02 Jan 2024 11:38) (88% - 100%)    Parameters below as of 02 Jan 2024 11:38  Patient On (Oxygen Delivery Method): BiPAP/CPAP      GENERAL: No acute distress, well-developed  HEAD:  Atraumatic, Normocephalic  EYES: EOMI, PERRLA, conjunctiva and sclera clear  NECK: Supple, no lymphadenopathy, no JVD  CHEST/LUNG: B/L inspiratory and expiratory wheezing   HEART: Regular rate and rhythm; No murmurs, rubs, or gallops  ABDOMEN: Soft, non-tender, non-distended; normal bowel sounds,  EXTREMITIES:  2+ peripheral pulses b/l, No clubbing, cyanosis, or edema  NEUROLOGY: A&O x 3, no focal deficits  SKIN: No rashes or lesions    LABS:                        14.6   6.13  )-----------( 157      ( 02 Jan 2024 04:30 )             43.6     01-02    131<L>  |  91<L>  |  34<H>  ----------------------------<  153<H>  4.8   |  30  |  0.9    Ca    8.0<L>      02 Jan 2024 04:30    TPro  6.5  /  Alb  4.1  /  TBili  0.3  /  DBili  x   /  AST  25  /  ALT  19  /  AlkPhos  62  01-0      Urinalysis Basic - ( 02 Jan 2024 04:30 )    Color: x / Appearance: x / SG: x / pH: x  Gluc: 153 mg/dL / Ketone: x  / Bili: x / Urobili: x   Blood: x / Protein: x / Nitrite: x   Leuk Esterase: x / RBC: x / WBC x   Sq Epi: x / Non Sq Epi: x / Bacteria: x

## 2024-01-02 NOTE — PROGRESS NOTE ADULT - ASSESSMENT
Patient is 69 yo female with long term smoking, has not seen a physician for many years presenting with       COPD exacerbation likely secondary to Influenza A Virus   Acute hypoxic hypercarbic respiratory failure: On BIPAP  Sepsis present on admission (Tmax 101F + Source+ organ dysfunction)    Active Tobacco smoking   - CT Chest ruled our PE with scattered small nodules   -c/w Tamiflu, Steroids Duonebs and inhalers   Counselled on Tobacco cessation c/w Nicotine patch  -Pulmonary consult appreciated: O/P Scattered small nodules to document stability  -c/w Antibiotics pending F/up ID consult     DVT PPX: Lovenox   GI PPX: PPI   Full Code   Dispo: from Home   Pending Clinical improvement   Tapering off Supplemental oxygen      Patient is 67 yo female with long term smoking, has not seen a physician for many years presenting with       COPD exacerbation likely secondary to Influenza A Virus   Acute hypoxic hypercarbic respiratory failure: On BIPAP  Sepsis present on admission (Tmax 101F + Source+ organ dysfunction)    Active Tobacco smoking   - CT Chest ruled our PE with scattered small nodules   -c/w Tamiflu, Steroids Duonebs and inhalers   Counselled on Tobacco cessation c/w Nicotine patch  -Pulmonary consult appreciated: O/P Scattered small nodules to document stability  -c/w Antibiotics pending F/up ID consult     DVT PPX: Lovenox   GI PPX: PPI   Full Code   Dispo: from Home   Pending Clinical improvement   Tapering off Supplemental oxygen

## 2024-01-02 NOTE — CONSULT NOTE ADULT - SUBJECTIVE AND OBJECTIVE BOX
INFECTIOUS DISEASE CONSULT NOTE    Patient is a 68y old  Female who presents with a chief complaint of SOB/Cough (02 Jan 2024 05:44)    HPI:  Patient is 69 yo female who has not seen a doctor for many years presenting with SOB, cough that has been going on for the past several days.  recently came back from [place].  denies any chest pain, palpitation, abdm pain, N/V/D, headache, dizziness, blurry vision, numbness or weakness (01 Jan 2024 23:27)         Prior hospital charts reviewed [Yes]  Primary team notes reviewed [Yes]  Other consultant notes reviewed [Yes]    REVIEW OF SYSTEMS:      PAST MEDICAL & SURGICAL HISTORY:  S/P bunionectomy          SOCIAL HISTORY:  - Born in _____, migrated to [place] in 19XX  - Currently working as / Retired  - Lives with _____; no pets  - No recent travel  - Denies tobacco use  - Denies alcohol use  - Denies illicit drug use  - Currently sexually active, has one male/female sexual partner    FAMILY HISTORY:      Allergies:  No Known Allergies      ANTIMICROBIALS:  azithromycin  IVPB 500 every 24 hours  cefTRIAXone   IVPB 1000 every 24 hours  oseltamivir 75 two times a day      ANTIMICROBIALS (past 90 days):  MEDICATIONS  (STANDING):    azithromycin  IVPB   255 mL/Hr IV Intermittent (01-01-24 @ 21:03)    cefTRIAXone   IVPB   100 mL/Hr IV Intermittent (01-01-24 @ 21:02)    oseltamivir   75 milliGRAM(s) Oral (01-02-24 @ 06:29)   75 milliGRAM(s) Oral (01-01-24 @ 23:58)        OTHER MEDS:   MEDICATIONS  (STANDING):  acetaminophen     Tablet .. 650 every 6 hours PRN  albuterol    90 MICROgram(s) HFA Inhaler 2 every 4 hours PRN  albuterol/ipratropium for Nebulization 3 every 6 hours  budesonide 160 MICROgram(s)/formoterol 4.5 MICROgram(s) Inhaler 2 two times a day  heparin   Injectable 5000 every 12 hours  methylPREDNISolone sodium succinate Injectable 40 every 8 hours  ondansetron Injectable 4 every 6 hours PRN  pantoprazole    Tablet 40 before breakfast  senna 2 at bedtime PRN      VITALS:  Vital Signs Last 24 Hrs  T(F): 97.1 (01-02-24 @ 07:29), Max: 101.3 (01-01-24 @ 19:44)    Vital Signs Last 24 Hrs  HR: 73 (01-02-24 @ 09:22) (73 - 100)  BP: 144/70 (01-02-24 @ 09:22) (117/56 - 171/78)  RR: 22 (01-02-24 @ 09:22)  SpO2: 97% (01-02-24 @ 09:22) (88% - 100%)  Wt(kg): --    EXAM:    Labs:                        14.6   6.13  )-----------( 157      ( 02 Jan 2024 04:30 )             43.6     01-02    131<L>  |  91<L>  |  34<H>  ----------------------------<  153<H>  4.8   |  30  |  0.9    Ca    8.0<L>      02 Jan 2024 04:30    TPro  6.5  /  Alb  4.1  /  TBili  0.3  /  DBili  x   /  AST  25  /  ALT  19  /  AlkPhos  62  01-02      WBC Trend:  WBC Count: 6.13 (01-02-24 @ 04:30)  WBC Count: 8.63 (01-01-24 @ 20:22)      Auto Neutrophil #: 6.35 K/uL (01-01-24 @ 20:22)      Creatine Trend:  Creatinine: 0.9 (01-02)  Creatinine: 0.9 (01-01)      Liver Biochemical Testing Trend:  Alanine Aminotransferase (ALT/SGPT): 19 (01-02)  Alanine Aminotransferase (ALT/SGPT): 21 (01-01)  Aspartate Aminotransferase (AST/SGOT): 25 (01-02-24 @ 04:30)  Aspartate Aminotransferase (AST/SGOT): 32 (01-01-24 @ 20:22)  Bilirubin Total: 0.3 (01-02)  Bilirubin Total: 0.5 (01-01)      Trend LDH      Auto Eosinophil %: 0.0 % (01-01-24 @ 20:22)      Urinalysis Basic - ( 02 Jan 2024 04:30 )    Color: x / Appearance: x / SG: x / pH: x  Gluc: 153 mg/dL / Ketone: x  / Bili: x / Urobili: x   Blood: x / Protein: x / Nitrite: x   Leuk Esterase: x / RBC: x / WBC x   Sq Epi: x / Non Sq Epi: x / Bacteria: x        MICROBIOLOGY:      Blood Gas Venous - Lactate: 1.5 (01-01 @ 19:54)        RADIOLOGY:  imaging below personally reviewed    < from: CT Angio Chest PE Protocol w/ IV Cont (01.02.24 @ 01:46) >  INTERPRETATION:    PULMONARY ARTERIES: There are no pulmonary emboli.    AIRWAYS/LUNGS/PLEURA: Patent central tracheobronchial tree. No focal   consolidation, effusion or pneumothorax. Biapical scarring, greater on   the right. Centrilobular emphysema. Subpleural reticulations. Mild   diffuse central bronchial thickening noted likely bronchitis.    4 mm left upper lobe micronodule (303-74).  3 mm left lower lobe micronodule (303-82).  3 mm left lower lobe micronodule (303-159).    MEDIASTINUM: No lymphadenopathy by size criteria. Mildly patulous   esophagus.    HEART AND VESSELS: Unremarkable heart size. No pericardial effusion.   Normal caliber thoracic aorta.    UPPER ABDOMEN: Limited, unremarkable.    BONES AND SOFT TISSUES: No acute osseous abnormality      IMPRESSION:    No pulmonary embolism.    Mild diffuse central bronchial thickening noted likely bronchitis.    Scattered small nodules as above. 12 month follow-up CT is recommended.    --- End of Report ---    < end of copied text >   INFECTIOUS DISEASE CONSULT NOTE    Patient is a 68y old  Female who presents with a chief complaint of SOB/Cough (2024 05:44)    HPI:  Patient is 69 yo female who has not seen a doctor for many years presenting with SOB, cough that has been going on for the past several days.  recently came back from [place].  denies any chest pain, palpitation, abdm pain, N/V/D, headache, dizziness, blurry vision, numbness or weakness (2024 23:27)      Prior hospital charts reviewed [Yes]  Primary team notes reviewed [Yes]  Other consultant notes reviewed [Yes]    REVIEW OF SYSTEMS:  CONSTITUTIONAL: No fever or chills  HEAD: No lesion on scalp  EYES: No visual disturbance  ENT: No sore throat  RESPIRATORY: No cough, + shortness of breath  CARDIOVASCULAR: No chest pain or palpitations  GASTROINTESTINAL: No abdominal or epigastric pain  GENITOURINARY: No dysuria  NEUROLOGICAL: No headache/dizziness  MUSCULOSKELETAL: No joint pain, erythema, or swelling; no back pain  SKIN: No itching, rashes  All other ROS negative except noted above      PAST MEDICAL & SURGICAL HISTORY:  S/P bunionectomy      SOCIAL HISTORY:  - No recent travel  - Active tobacco use  - Occasional alcohol use  - Denies illicit drug use    FAMILY HISTORY:  Father, , COPD    Allergies:  No Known Allergies      ANTIMICROBIALS:  azithromycin  IVPB 500 every 24 hours  cefTRIAXone   IVPB 1000 every 24 hours  oseltamivir 75 two times a day      ANTIMICROBIALS (past 90 days):  MEDICATIONS  (STANDING):    azithromycin  IVPB   255 mL/Hr IV Intermittent (24 @ 21:03)    cefTRIAXone   IVPB   100 mL/Hr IV Intermittent (24 @ 21:02)    oseltamivir   75 milliGRAM(s) Oral (24 @ 06:29)   75 milliGRAM(s) Oral (24 @ 23:58)        OTHER MEDS:   MEDICATIONS  (STANDING):  acetaminophen     Tablet .. 650 every 6 hours PRN  albuterol    90 MICROgram(s) HFA Inhaler 2 every 4 hours PRN  albuterol/ipratropium for Nebulization 3 every 6 hours  budesonide 160 MICROgram(s)/formoterol 4.5 MICROgram(s) Inhaler 2 two times a day  heparin   Injectable 5000 every 12 hours  methylPREDNISolone sodium succinate Injectable 40 every 8 hours  ondansetron Injectable 4 every 6 hours PRN  pantoprazole    Tablet 40 before breakfast  senna 2 at bedtime PRN      VITALS:  Vital Signs Last 24 Hrs  T(F): 97.1 (24 @ 07:29), Max: 101.3 (24 @ 19:44)    Vital Signs Last 24 Hrs  HR: 73 (24 @ 09:22) (73 - 100)  BP: 144/70 (24 @ 09:22) (117/56 - 171/78)  RR: 22 (24 @ 09:22)  SpO2: 97% (24 @ 09:22) (88% - 100%)  Wt(kg): --    EXAM:  GENERAL: In mild respiratory distress, lying in bed  HEAD: No head lesions  EYES: Conjunctiva pink and cornea white  EAR, NOSE, MOUTH, THROAT: Normal external ears and nose, no discharges; moist mucous membranes  NECK: Supple, nontender to palpation; no JVD  RESPIRATORY: Bilateral wheezing  CARDIOVASCULAR: S1 S2  GASTROINTESTINAL: Soft, nontender, nondistended; normoactive bowel sounds  EXTREMITIES: No clubbing, cyanosis, or petal edema  NERVOUS SYSTEM: Alert and oriented to person, time, place and situation, speech clear. No focal deficits   MUSCULOSKELETAL: No joint erythema, swelling or pain  SKIN: No rashes or lesions, no superficial thrombophlebitis  PSYCH: Normal affect      Labs:                        14.6   6.13  )-----------( 157      ( 2024 04:30 )             43.6         131<L>  |  91<L>  |  34<H>  ----------------------------<  153<H>  4.8   |  30  |  0.9    Ca    8.0<L>      2024 04:30    TPro  6.5  /  Alb  4.1  /  TBili  0.3  /  DBili  x   /  AST  25  /  ALT  19  /  AlkPhos  62        WBC Trend:  WBC Count: 6.13 (24 @ 04:30)  WBC Count: 8.63 (24 @ 20:22)      Auto Neutrophil #: 6.35 K/uL (24 @ 20:22)      Creatine Trend:  Creatinine: 0.9 ()  Creatinine: 0.9 ()      Liver Biochemical Testing Trend:  Alanine Aminotransferase (ALT/SGPT): 19 ()  Alanine Aminotransferase (ALT/SGPT): 21 ()  Aspartate Aminotransferase (AST/SGOT): 25 (24 @ 04:30)  Aspartate Aminotransferase (AST/SGOT): 32 (24 @ 20:22)  Bilirubin Total: 0.3 ()  Bilirubin Total: 0.5 ()      Trend LDH      Auto Eosinophil %: 0.0 % (24 @ 20:22)      Urinalysis Basic - ( 2024 04:30 )    Color: x / Appearance: x / SG: x / pH: x  Gluc: 153 mg/dL / Ketone: x  / Bili: x / Urobili: x   Blood: x / Protein: x / Nitrite: x   Leuk Esterase: x / RBC: x / WBC x   Sq Epi: x / Non Sq Epi: x / Bacteria: x        MICROBIOLOGY:      Blood Gas Venous - Lactate: 1.5 ( @ 19:54)        RADIOLOGY:  imaging below personally reviewed    < from: CT Angio Chest PE Protocol w/ IV Cont (24 @ 01:46) >  INTERPRETATION:    PULMONARY ARTERIES: There are no pulmonary emboli.    AIRWAYS/LUNGS/PLEURA: Patent central tracheobronchial tree. No focal   consolidation, effusion or pneumothorax. Biapical scarring, greater on   the right. Centrilobular emphysema. Subpleural reticulations. Mild   diffuse central bronchial thickening noted likely bronchitis.    4 mm left upper lobe micronodule (303-74).  3 mm left lower lobe micronodule (303-82).  3 mm left lower lobe micronodule (303-159).    MEDIASTINUM: No lymphadenopathy by size criteria. Mildly patulous   esophagus.    HEART AND VESSELS: Unremarkable heart size. No pericardial effusion.   Normal caliber thoracic aorta.    UPPER ABDOMEN: Limited, unremarkable.    BONES AND SOFT TISSUES: No acute osseous abnormality      IMPRESSION:    No pulmonary embolism.    Mild diffuse central bronchial thickening noted likely bronchitis.    Scattered small nodules as above. 12 month follow-up CT is recommended.    --- End of Report ---    < end of copied text >

## 2024-01-02 NOTE — ED ADULT NURSE REASSESSMENT NOTE - NS ED NURSE REASSESS COMMENT FT1
MILTON Keyes [id number] made aware patient became lethargic off BIPAP, was on NC 3L. FS and v/s taken, patient placed back on BIPAP by respiratory therapist. Patient became awake and alert with BIPAP on. As per PA keep patient on BIPAP at this time. Patient verbalized back understanding. Patient is a/o x 4. MILTON Keyes [id number] made aware patient became lethargic off BIPAP, was on NC 3L. FS and v/s taken, patient placed back on BIPAP by respiratory therapist. Patient became awake and alert with BIPAP on. As per PA keep patient on BIPAP at this time. Patient verbalized back understanding. Patient is a/o x 4.

## 2024-01-02 NOTE — CONSULT NOTE ADULT - SUBJECTIVE AND OBJECTIVE BOX
HPI:  Patient is 67 yo female who has not seen a doctor for many years presenting with SOB, cough that has been going on for the past several days.  recently came back from [place].  denies any chest pain, palpitation, abdm pain, N/V/D, headache, dizziness, blurry vision, numbness or weakness (01 Jan 2024 23:27)         I reviewed the radiology tests and hospital record including the ED chart.    I reviewed the other consultants comments that are available in the chart.    CC/ HPI Patient is a 68y old  Female who presents with a chief complaint of SOB/Cough (01 Jan 2024 23:27)      Acute on chronic respiratory failure with hypoxia    Dental abscess    Acute on chronic respiratory failure with hypoxia and hypercapnia    S/P bunionectomy    SOB        COPD exacerbation    Influenza A          FAMILY HISTORY:      No Known Allergies      Soc:  see Hpi.    Home prescriptions      MEDICATIONS  (STANDING):  albuterol/ipratropium for Nebulization 3 milliLiter(s) Nebulizer every 6 hours  azithromycin  IVPB 500 milliGRAM(s) IV Intermittent every 24 hours  budesonide 160 MICROgram(s)/formoterol 4.5 MICROgram(s) Inhaler 2 Puff(s) Inhalation two times a day  cefTRIAXone   IVPB 1000 milliGRAM(s) IV Intermittent every 24 hours  heparin   Injectable 5000 Unit(s) SubCutaneous every 12 hours  lactobacillus acidophilus 1 Tablet(s) Oral three times a day with meals  methylPREDNISolone sodium succinate Injectable 40 milliGRAM(s) IV Push every 8 hours  nicotine - 21 mG/24Hr(s) Patch 21 Patch Transdermal daily  oseltamivir 75 milliGRAM(s) Oral two times a day  pantoprazole    Tablet 40 milliGRAM(s) Oral before breakfast    MEDICATIONS  (PRN):  acetaminophen     Tablet .. 650 milliGRAM(s) Oral every 6 hours PRN Temp greater or equal to 38C (100.4F), Mild Pain (1 - 3)  albuterol    90 MICROgram(s) HFA Inhaler 2 Puff(s) Inhalation every 4 hours PRN Shortness of Breath and/or Wheezing  ondansetron Injectable 4 milliGRAM(s) IV Push every 6 hours PRN Nausea  senna 2 Tablet(s) Oral at bedtime PRN Constipation      sodium chloride 0.9% Bolus:   1000 milliLiter(s), IV Bolus, once, infuse over 60 Minute(s), Stop After 1 Doses  Provider's Contact #: [phone]      T(C): 35.2 (01-02-24 @ 05:10), Max: 38.5 (01-01-24 @ 19:44)  HR: 74 (01-02-24 @ 05:10) (74 - 100)  BP: 124/60 (01-02-24 @ 05:10) (117/56 - 171/78)  RR: 14 (01-02-24 @ 05:10) (14 - 24)  SpO2: 100% (01-02-24 @ 05:10) (88% - 100%)  ICU Vital Signs Last 24 Hrs  T(C): 35.2 (02 Jan 2024 05:10), Max: 38.5 (01 Jan 2024 19:44)  T(F): 95.4 (02 Jan 2024 05:10), Max: 101.3 (01 Jan 2024 19:44)  HR: 74 (02 Jan 2024 05:10) (74 - 100)  BP: 124/60 (02 Jan 2024 05:10) (117/56 - 171/78  RR: 14 (02 Jan 2024 05:10) (14 - 24)  SpO2: 100% (02 Jan 2024 05:10) (88% - 100%)    O2 Parameters below as of 02 Jan 2024 05:10  Patient On (Oxygen Delivery Method): BiPAP/CPAP    O2 Concentration (%): 45    I&O's Summary      I&O's Detail      Drug Dosing Weight    Weight (kg): 68 (01 Jan 2024 19:44)    LABS:                          15.2   8.63  )-----------( 201      ( 01 Jan 2024 20:22 )             44.3       01-01    131<L>  |  88<L>  |  37<H>  ----------------------------<  106<H>  4.6   |  31  |  0.9    Ca    8.3<L>      01 Jan 2024 20:22    TPro  7.0  /  Alb  4.2  /  TBili  0.5  /  DBili  x   /  AST  32  /  ALT  21  /  AlkPhos  66  01-01      LIVER FUNCTIONS - ( 01 Jan 2024 20:22 )  Alb: 4.2 g/dL / Pro: 7.0 g/dL / ALK PHOS: 66 U/L / ALT: 21 U/L / AST: 32 U/L / GGT: x               Urinalysis Basic - ( 01 Jan 2024 20:22 )    Color: x / Appearance: x / SG: x / pH: x  Gluc: 106 mg/dL / Ketone: x  / Bili: x / Urobili: x   Blood: x / Protein: x / Nitrite: x   Leuk Esterase: x / RBC: x / WBC x   Sq Epi: x / Non Sq Epi: x / Bacteria: x        azithromycin  IVPB 500 milliGRAM(s) IV Intermittent every 24 hours  cefTRIAXone   IVPB 1000 milliGRAM(s) IV Intermittent every 24 hours  oseltamivir 75 milliGRAM(s) Oral two times a day      RADIOLOGY:  I have personally reviewed all chest and other pertinent radiology films.         REVIEW OF SYSTEMS:   see Hpi.    PHYSICAL EXAM:       · ENMT:   Airway patent,   Nasal mucosa clear.  Mouth with normal mucosa.   No thrush    · EYES:   Clear bilaterally,   pupils equal,   round and reactive to light.    · CARDIAC:   Normal rate,   regular rhythm.    Heart sounds S1, S2.   no thrills or bruits on palpitation  normal  cardiac impulse  No murmurs, no rubs or gallops on auscultation  no edema        CAROTID:   normal systolic impulse  no bruits    · RESPIRATORY:   no w/r/r/,   normal chest expansion  not tachypneic,  no retractions or use of accessory muscles  palpation of chest is normal with no fremitus  percussion of chest demonstrates no hyperresonance or dullness    · GASTROINTESTINAL:  Abdomen soft,   non-tender,   no guarding,   + BS  liver spleen not palpable      · MUSCULOSKELETAL:   range of motion is not limited,  no clubbing, cyanosis  no petechiae      · SKIN:   Skin normal color for race,   warm,   dry and intact.       · HEME LYMPH:   no splenomegaly.  No cervical  lymphadenopathy.  no inguinal lymphadenopathy         HPI:  Patient is 69 yo female who has not seen a doctor for many years presenting with SOB, cough that has been going on for the past several days.  recently came back from [place].  denies any chest pain, palpitation, abdm pain, N/V/D, headache, dizziness, blurry vision, numbness or weakness (01 Jan 2024 23:27)         I reviewed the radiology tests and hospital record including the ED chart.    I reviewed the other consultants comments that are available in the chart.    CC/ HPI Patient is a 68y old  Female who presents with a chief complaint of SOB/Cough (01 Jan 2024 23:27)      Acute on chronic respiratory failure with hypoxia    Dental abscess    Acute on chronic respiratory failure with hypoxia and hypercapnia    S/P bunionectomy    SOB        COPD exacerbation    Influenza A          FAMILY HISTORY:      No Known Allergies      Soc:  see Hpi.    Home prescriptions      MEDICATIONS  (STANDING):  albuterol/ipratropium for Nebulization 3 milliLiter(s) Nebulizer every 6 hours  azithromycin  IVPB 500 milliGRAM(s) IV Intermittent every 24 hours  budesonide 160 MICROgram(s)/formoterol 4.5 MICROgram(s) Inhaler 2 Puff(s) Inhalation two times a day  cefTRIAXone   IVPB 1000 milliGRAM(s) IV Intermittent every 24 hours  heparin   Injectable 5000 Unit(s) SubCutaneous every 12 hours  lactobacillus acidophilus 1 Tablet(s) Oral three times a day with meals  methylPREDNISolone sodium succinate Injectable 40 milliGRAM(s) IV Push every 8 hours  nicotine - 21 mG/24Hr(s) Patch 21 Patch Transdermal daily  oseltamivir 75 milliGRAM(s) Oral two times a day  pantoprazole    Tablet 40 milliGRAM(s) Oral before breakfast    MEDICATIONS  (PRN):  acetaminophen     Tablet .. 650 milliGRAM(s) Oral every 6 hours PRN Temp greater or equal to 38C (100.4F), Mild Pain (1 - 3)  albuterol    90 MICROgram(s) HFA Inhaler 2 Puff(s) Inhalation every 4 hours PRN Shortness of Breath and/or Wheezing  ondansetron Injectable 4 milliGRAM(s) IV Push every 6 hours PRN Nausea  senna 2 Tablet(s) Oral at bedtime PRN Constipation      sodium chloride 0.9% Bolus:   1000 milliLiter(s), IV Bolus, once, infuse over 60 Minute(s), Stop After 1 Doses  Provider's Contact #: [phone]      T(C): 35.2 (01-02-24 @ 05:10), Max: 38.5 (01-01-24 @ 19:44)  HR: 74 (01-02-24 @ 05:10) (74 - 100)  BP: 124/60 (01-02-24 @ 05:10) (117/56 - 171/78)  RR: 14 (01-02-24 @ 05:10) (14 - 24)  SpO2: 100% (01-02-24 @ 05:10) (88% - 100%)  ICU Vital Signs Last 24 Hrs  T(C): 35.2 (02 Jan 2024 05:10), Max: 38.5 (01 Jan 2024 19:44)  T(F): 95.4 (02 Jan 2024 05:10), Max: 101.3 (01 Jan 2024 19:44)  HR: 74 (02 Jan 2024 05:10) (74 - 100)  BP: 124/60 (02 Jan 2024 05:10) (117/56 - 171/78  RR: 14 (02 Jan 2024 05:10) (14 - 24)  SpO2: 100% (02 Jan 2024 05:10) (88% - 100%)    O2 Parameters below as of 02 Jan 2024 05:10  Patient On (Oxygen Delivery Method): BiPAP/CPAP    O2 Concentration (%): 45    I&O's Summary      I&O's Detail      Drug Dosing Weight    Weight (kg): 68 (01 Jan 2024 19:44)    LABS:                          15.2   8.63  )-----------( 201      ( 01 Jan 2024 20:22 )             44.3       01-01    131<L>  |  88<L>  |  37<H>  ----------------------------<  106<H>  4.6   |  31  |  0.9    Ca    8.3<L>      01 Jan 2024 20:22    TPro  7.0  /  Alb  4.2  /  TBili  0.5  /  DBili  x   /  AST  32  /  ALT  21  /  AlkPhos  66  01-01      LIVER FUNCTIONS - ( 01 Jan 2024 20:22 )  Alb: 4.2 g/dL / Pro: 7.0 g/dL / ALK PHOS: 66 U/L / ALT: 21 U/L / AST: 32 U/L / GGT: x               Urinalysis Basic - ( 01 Jan 2024 20:22 )    Color: x / Appearance: x / SG: x / pH: x  Gluc: 106 mg/dL / Ketone: x  / Bili: x / Urobili: x   Blood: x / Protein: x / Nitrite: x   Leuk Esterase: x / RBC: x / WBC x   Sq Epi: x / Non Sq Epi: x / Bacteria: x        azithromycin  IVPB 500 milliGRAM(s) IV Intermittent every 24 hours  cefTRIAXone   IVPB 1000 milliGRAM(s) IV Intermittent every 24 hours  oseltamivir 75 milliGRAM(s) Oral two times a day      RADIOLOGY:  I have personally reviewed all chest and other pertinent radiology films.         REVIEW OF SYSTEMS:   see Hpi.    PHYSICAL EXAM:       · ENMT:   Airway patent,   Nasal mucosa clear.  Mouth with normal mucosa.   No thrush    · EYES:   Clear bilaterally,   pupils equal,   round and reactive to light.    · CARDIAC:   Normal rate,   regular rhythm.    Heart sounds S1, S2.   no thrills or bruits on palpitation  normal  cardiac impulse  No murmurs, no rubs or gallops on auscultation  no edema        CAROTID:   normal systolic impulse  no bruits    · RESPIRATORY:   no w/r/r/,   normal chest expansion  not tachypneic,  no retractions or use of accessory muscles  palpation of chest is normal with no fremitus  percussion of chest demonstrates no hyperresonance or dullness    · GASTROINTESTINAL:  Abdomen soft,   non-tender,   no guarding,   + BS  liver spleen not palpable      · MUSCULOSKELETAL:   range of motion is not limited,  no clubbing, cyanosis  no petechiae      · SKIN:   Skin normal color for race,   warm,   dry and intact.       · HEME LYMPH:   no splenomegaly.  No cervical  lymphadenopathy.  no inguinal lymphadenopathy

## 2024-01-03 LAB
ANION GAP SERPL CALC-SCNC: 7 MMOL/L — SIGNIFICANT CHANGE UP (ref 7–14)
ANION GAP SERPL CALC-SCNC: 7 MMOL/L — SIGNIFICANT CHANGE UP (ref 7–14)
BASOPHILS # BLD AUTO: 0.02 K/UL — SIGNIFICANT CHANGE UP (ref 0–0.2)
BASOPHILS # BLD AUTO: 0.02 K/UL — SIGNIFICANT CHANGE UP (ref 0–0.2)
BASOPHILS NFR BLD AUTO: 0.2 % — SIGNIFICANT CHANGE UP (ref 0–1)
BASOPHILS NFR BLD AUTO: 0.2 % — SIGNIFICANT CHANGE UP (ref 0–1)
BUN SERPL-MCNC: 35 MG/DL — HIGH (ref 10–20)
BUN SERPL-MCNC: 35 MG/DL — HIGH (ref 10–20)
CALCIUM SERPL-MCNC: 8.5 MG/DL — SIGNIFICANT CHANGE UP (ref 8.4–10.5)
CALCIUM SERPL-MCNC: 8.5 MG/DL — SIGNIFICANT CHANGE UP (ref 8.4–10.5)
CHLORIDE SERPL-SCNC: 92 MMOL/L — LOW (ref 98–110)
CHLORIDE SERPL-SCNC: 92 MMOL/L — LOW (ref 98–110)
CO2 SERPL-SCNC: 37 MMOL/L — HIGH (ref 17–32)
CO2 SERPL-SCNC: 37 MMOL/L — HIGH (ref 17–32)
CREAT SERPL-MCNC: 0.9 MG/DL — SIGNIFICANT CHANGE UP (ref 0.7–1.5)
CREAT SERPL-MCNC: 0.9 MG/DL — SIGNIFICANT CHANGE UP (ref 0.7–1.5)
EGFR: 69 ML/MIN/1.73M2 — SIGNIFICANT CHANGE UP
EGFR: 69 ML/MIN/1.73M2 — SIGNIFICANT CHANGE UP
EOSINOPHIL # BLD AUTO: 0 K/UL — SIGNIFICANT CHANGE UP (ref 0–0.7)
EOSINOPHIL # BLD AUTO: 0 K/UL — SIGNIFICANT CHANGE UP (ref 0–0.7)
EOSINOPHIL NFR BLD AUTO: 0 % — SIGNIFICANT CHANGE UP (ref 0–8)
EOSINOPHIL NFR BLD AUTO: 0 % — SIGNIFICANT CHANGE UP (ref 0–8)
GLUCOSE SERPL-MCNC: 131 MG/DL — HIGH (ref 70–99)
GLUCOSE SERPL-MCNC: 131 MG/DL — HIGH (ref 70–99)
HCT VFR BLD CALC: 41.2 % — SIGNIFICANT CHANGE UP (ref 37–47)
HCT VFR BLD CALC: 41.2 % — SIGNIFICANT CHANGE UP (ref 37–47)
HGB BLD-MCNC: 13.4 G/DL — SIGNIFICANT CHANGE UP (ref 12–16)
HGB BLD-MCNC: 13.4 G/DL — SIGNIFICANT CHANGE UP (ref 12–16)
IMM GRANULOCYTES NFR BLD AUTO: 0.5 % — HIGH (ref 0.1–0.3)
IMM GRANULOCYTES NFR BLD AUTO: 0.5 % — HIGH (ref 0.1–0.3)
LACTATE SERPL-SCNC: 2.9 MMOL/L — HIGH (ref 0.7–2)
LACTATE SERPL-SCNC: 2.9 MMOL/L — HIGH (ref 0.7–2)
LYMPHOCYTES # BLD AUTO: 0.69 K/UL — LOW (ref 1.2–3.4)
LYMPHOCYTES # BLD AUTO: 0.69 K/UL — LOW (ref 1.2–3.4)
LYMPHOCYTES # BLD AUTO: 7 % — LOW (ref 20.5–51.1)
LYMPHOCYTES # BLD AUTO: 7 % — LOW (ref 20.5–51.1)
MCHC RBC-ENTMCNC: 27.8 PG — SIGNIFICANT CHANGE UP (ref 27–31)
MCHC RBC-ENTMCNC: 27.8 PG — SIGNIFICANT CHANGE UP (ref 27–31)
MCHC RBC-ENTMCNC: 32.5 G/DL — SIGNIFICANT CHANGE UP (ref 32–37)
MCHC RBC-ENTMCNC: 32.5 G/DL — SIGNIFICANT CHANGE UP (ref 32–37)
MCV RBC AUTO: 85.5 FL — SIGNIFICANT CHANGE UP (ref 81–99)
MCV RBC AUTO: 85.5 FL — SIGNIFICANT CHANGE UP (ref 81–99)
MONOCYTES # BLD AUTO: 0.57 K/UL — SIGNIFICANT CHANGE UP (ref 0.1–0.6)
MONOCYTES # BLD AUTO: 0.57 K/UL — SIGNIFICANT CHANGE UP (ref 0.1–0.6)
MONOCYTES NFR BLD AUTO: 5.8 % — SIGNIFICANT CHANGE UP (ref 1.7–9.3)
MONOCYTES NFR BLD AUTO: 5.8 % — SIGNIFICANT CHANGE UP (ref 1.7–9.3)
NEUTROPHILS # BLD AUTO: 8.58 K/UL — HIGH (ref 1.4–6.5)
NEUTROPHILS # BLD AUTO: 8.58 K/UL — HIGH (ref 1.4–6.5)
NEUTROPHILS NFR BLD AUTO: 86.5 % — HIGH (ref 42.2–75.2)
NEUTROPHILS NFR BLD AUTO: 86.5 % — HIGH (ref 42.2–75.2)
NRBC # BLD: 0 /100 WBCS — SIGNIFICANT CHANGE UP (ref 0–0)
NRBC # BLD: 0 /100 WBCS — SIGNIFICANT CHANGE UP (ref 0–0)
PLATELET # BLD AUTO: 173 K/UL — SIGNIFICANT CHANGE UP (ref 130–400)
PLATELET # BLD AUTO: 173 K/UL — SIGNIFICANT CHANGE UP (ref 130–400)
PMV BLD: 10.5 FL — HIGH (ref 7.4–10.4)
PMV BLD: 10.5 FL — HIGH (ref 7.4–10.4)
POTASSIUM SERPL-MCNC: 5.1 MMOL/L — HIGH (ref 3.5–5)
POTASSIUM SERPL-MCNC: 5.1 MMOL/L — HIGH (ref 3.5–5)
POTASSIUM SERPL-SCNC: 5.1 MMOL/L — HIGH (ref 3.5–5)
POTASSIUM SERPL-SCNC: 5.1 MMOL/L — HIGH (ref 3.5–5)
PROCALCITONIN SERPL-MCNC: 0.25 NG/ML — HIGH (ref 0.02–0.1)
PROCALCITONIN SERPL-MCNC: 0.25 NG/ML — HIGH (ref 0.02–0.1)
RBC # BLD: 4.82 M/UL — SIGNIFICANT CHANGE UP (ref 4.2–5.4)
RBC # BLD: 4.82 M/UL — SIGNIFICANT CHANGE UP (ref 4.2–5.4)
RBC # FLD: 12.8 % — SIGNIFICANT CHANGE UP (ref 11.5–14.5)
RBC # FLD: 12.8 % — SIGNIFICANT CHANGE UP (ref 11.5–14.5)
SODIUM SERPL-SCNC: 136 MMOL/L — SIGNIFICANT CHANGE UP (ref 135–146)
SODIUM SERPL-SCNC: 136 MMOL/L — SIGNIFICANT CHANGE UP (ref 135–146)
WBC # BLD: 9.91 K/UL — SIGNIFICANT CHANGE UP (ref 4.8–10.8)
WBC # BLD: 9.91 K/UL — SIGNIFICANT CHANGE UP (ref 4.8–10.8)
WBC # FLD AUTO: 9.91 K/UL — SIGNIFICANT CHANGE UP (ref 4.8–10.8)
WBC # FLD AUTO: 9.91 K/UL — SIGNIFICANT CHANGE UP (ref 4.8–10.8)

## 2024-01-03 PROCEDURE — 99232 SBSQ HOSP IP/OBS MODERATE 35: CPT

## 2024-01-03 RX ORDER — GUAIFENESIN/DEXTROMETHORPHAN 600MG-30MG
10 TABLET, EXTENDED RELEASE 12 HR ORAL EVERY 8 HOURS
Refills: 0 | Status: DISCONTINUED | OUTPATIENT
Start: 2024-01-03 | End: 2024-01-04

## 2024-01-03 RX ORDER — INFLUENZA VIRUS VACCINE 15; 15; 15; 15 UG/.5ML; UG/.5ML; UG/.5ML; UG/.5ML
0.7 SUSPENSION INTRAMUSCULAR ONCE
Refills: 0 | Status: DISCONTINUED | OUTPATIENT
Start: 2024-01-03 | End: 2024-01-05

## 2024-01-03 RX ADMIN — Medication 1 TABLET(S): at 08:51

## 2024-01-03 RX ADMIN — Medication 10 MILLILITER(S): at 11:27

## 2024-01-03 RX ADMIN — PANTOPRAZOLE SODIUM 40 MILLIGRAM(S): 20 TABLET, DELAYED RELEASE ORAL at 06:22

## 2024-01-03 RX ADMIN — AZITHROMYCIN 255 MILLIGRAM(S): 500 TABLET, FILM COATED ORAL at 21:30

## 2024-01-03 RX ADMIN — Medication 3 MILLILITER(S): at 08:53

## 2024-01-03 RX ADMIN — Medication 3 MILLILITER(S): at 13:32

## 2024-01-03 RX ADMIN — Medication 10 MILLILITER(S): at 21:30

## 2024-01-03 RX ADMIN — Medication 3 MILLILITER(S): at 21:28

## 2024-01-03 RX ADMIN — Medication 21 PATCH: at 13:49

## 2024-01-03 RX ADMIN — BUDESONIDE AND FORMOTEROL FUMARATE DIHYDRATE 2 PUFF(S): 160; 4.5 AEROSOL RESPIRATORY (INHALATION) at 08:51

## 2024-01-03 RX ADMIN — Medication 21 PATCH: at 18:01

## 2024-01-03 RX ADMIN — Medication 75 MILLIGRAM(S): at 06:21

## 2024-01-03 RX ADMIN — Medication 1 TABLET(S): at 18:47

## 2024-01-03 RX ADMIN — ENOXAPARIN SODIUM 40 MILLIGRAM(S): 100 INJECTION SUBCUTANEOUS at 21:30

## 2024-01-03 RX ADMIN — Medication 40 MILLIGRAM(S): at 13:34

## 2024-01-03 RX ADMIN — Medication 75 MILLIGRAM(S): at 18:48

## 2024-01-03 RX ADMIN — Medication 1 TABLET(S): at 13:33

## 2024-01-03 RX ADMIN — Medication 40 MILLIGRAM(S): at 06:21

## 2024-01-03 RX ADMIN — Medication 40 MILLIGRAM(S): at 21:30

## 2024-01-03 NOTE — PROGRESS NOTE ADULT - SUBJECTIVE AND OBJECTIVE BOX
Patient is a 68y old  Female who presents with a chief complaint of SOB/Cough (02 Jan 2024 12:42)      MEDICATIONS  (STANDING):  albuterol/ipratropium for Nebulization 3 milliLiter(s) Nebulizer every 6 hours  azithromycin  IVPB 500 milliGRAM(s) IV Intermittent every 24 hours  budesonide 160 MICROgram(s)/formoterol 4.5 MICROgram(s) Inhaler 2 Puff(s) Inhalation two times a day  enoxaparin Injectable 40 milliGRAM(s) SubCutaneous every 24 hours  lactobacillus acidophilus 1 Tablet(s) Oral three times a day with meals  methylPREDNISolone sodium succinate Injectable 40 milliGRAM(s) IV Push every 8 hours  nicotine - 21 mG/24Hr(s) Patch 21 Patch Transdermal daily  oseltamivir 75 milliGRAM(s) Oral two times a day  pantoprazole    Tablet 40 milliGRAM(s) Oral before breakfast    MEDICATIONS  (PRN):  acetaminophen     Tablet .. 650 milliGRAM(s) Oral every 6 hours PRN Temp greater or equal to 38C (100.4F), Mild Pain (1 - 3)  albuterol    90 MICROgram(s) HFA Inhaler 2 Puff(s) Inhalation every 4 hours PRN Shortness of Breath and/or Wheezing  guaifenesin/dextromethorphan Oral Liquid 10 milliLiter(s) Oral every 8 hours PRN Cough  ondansetron Injectable 4 milliGRAM(s) IV Push every 6 hours PRN Nausea  senna 2 Tablet(s) Oral at bedtime PRN Constipation      CAPILLARY BLOOD GLUCOSE  I&O's Summary      PHYSICAL EXAM:  Vital Signs Last 24 Hrs  T(C): 36.6 (03 Jan 2024 14:20), Max: 37.2 (03 Jan 2024 05:32)  T(F): 97.9 (03 Jan 2024 14:20), Max: 98.9 (03 Jan 2024 05:32)  HR: 74 (03 Jan 2024 14:20) (74 - 85)  BP: 127/64 (03 Jan 2024 14:20) (126/59 - 130/73)  BP(mean): --  RR: 20 (03 Jan 2024 14:20) (20 - 30)  SpO2: 96% (03 Jan 2024 14:20) (93% - 96%)    Parameters below as of 03 Jan 2024 14:20  Patient On (Oxygen Delivery Method): nasal cannula  O2 Flow (L/min): 2.5      GENERAL: No acute distress, well-developed  HEAD:  Atraumatic, Normocephalic  EYES: EOMI, PERRLA, conjunctiva and sclera clear  NECK: Supple, no lymphadenopathy, no JVD  CHEST/LUNG: B/L inspiratory and expiratory wheezing improved   HEART: Regular rate and rhythm; No murmurs, rubs, or gallops  ABDOMEN: Soft, non-tender, non-distended; normal bowel sounds,  EXTREMITIES:  2+ peripheral pulses b/l, No clubbing, cyanosis, or edema  NEUROLOGY: A&O x 3, no focal deficits  SKIN: No rashes or lesions    LABS:                        13.4   9.91  )-----------( 173      ( 03 Jan 2024 06:21 )             41.2     01-03    136  |  92<L>  |  35<H>  ----------------------------<  131<H>  5.1<H>   |  37<H>  |  0.9    Ca    8.5      03 Jan 2024 06:21    TPro  6.5  /  Alb  4.1  /  TBili  0.3  /  DBili  x   /  AST  25  /  ALT  19  /  AlkPhos  62  01-02          Urinalysis Basic - ( 03 Jan 2024 06:21 )    Color: x / Appearance: x / SG: x / pH: x  Gluc: 131 mg/dL / Ketone: x  / Bili: x / Urobili: x   Blood: x / Protein: x / Nitrite: x   Leuk Esterase: x / RBC: x / WBC x   Sq Epi: x / Non Sq Epi: x / Bacteria: x        Culture - Blood (collected 01 Jan 2024 20:22)  Source: .Blood Blood  Preliminary Report (03 Jan 2024 06:01):    No growth at 24 hours    Culture - Blood (collected 01 Jan 2024 20:22)  Source: .Blood Blood  Preliminary Report (03 Jan 2024 06:01):    No growth at 24 hours

## 2024-01-03 NOTE — PATIENT PROFILE ADULT - FALL HARM RISK - HARM RISK INTERVENTIONS
Communicate Risk of Fall with Harm to all staff/Reinforce activity limits and safety measures with patient and family/Tailored Fall Risk Interventions/Visual Cue: Yellow wristband and red socks/Bed in lowest position, wheels locked, appropriate side rails in place/Call bell, personal items and telephone in reach/Instruct patient to call for assistance before getting out of bed or chair/Non-slip footwear when patient is out of bed/[place] to call system/Physically safe environment - no spills, clutter or unnecessary equipment/Purposeful Proactive Rounding/Room/bathroom lighting operational, light cord in reach Communicate Risk of Fall with Harm to all staff/Reinforce activity limits and safety measures with patient and family/Tailored Fall Risk Interventions/Visual Cue: Yellow wristband and red socks/Bed in lowest position, wheels locked, appropriate side rails in place/Call bell, personal items and telephone in reach/Instruct patient to call for assistance before getting out of bed or chair/Non-slip footwear when patient is out of bed/[place] to call system/Physically safe environment - no spills, clutter or unnecessary equipment/Purposeful Proactive Rounding/Room/bathroom lighting operational, light cord in reach

## 2024-01-03 NOTE — PROGRESS NOTE ADULT - ASSESSMENT
Patient is 69 yo female with long term smoking, has not seen a physician for many years presenting with     COPD exacerbation likely secondary to Influenza A Virus   Acute hypoxic hypercarbic respiratory failure: Was on BIPAP>>NC   Sepsis present on admission (Tmax 101F + Source+ organ dysfunction)    Active Tobacco smoking   -CT Chest ruled out PE with scattered small nodules   -c/w Tamiflu, Steroids Duonebs and inhalers   Counselled on Tobacco cessation c/w Nicotine patch  -Pulmonary consult appreciated: O/P Scattered small nodules to document stability  -ID consult appreciated: c/w azithromycin only   -Goal to taper off supplemental oxygen      DVT PPX: Lovenox   GI PPX: PPI   Full Code   Dispo: from Home   Pending Clinical improvement   Tapering off Supplemental oxygen

## 2024-01-04 LAB
ALBUMIN SERPL ELPH-MCNC: 3.9 G/DL — SIGNIFICANT CHANGE UP (ref 3.5–5.2)
ALBUMIN SERPL ELPH-MCNC: 3.9 G/DL — SIGNIFICANT CHANGE UP (ref 3.5–5.2)
ALP SERPL-CCNC: 64 U/L — SIGNIFICANT CHANGE UP (ref 30–115)
ALP SERPL-CCNC: 64 U/L — SIGNIFICANT CHANGE UP (ref 30–115)
ALT FLD-CCNC: 28 U/L — SIGNIFICANT CHANGE UP (ref 0–41)
ALT FLD-CCNC: 28 U/L — SIGNIFICANT CHANGE UP (ref 0–41)
ANION GAP SERPL CALC-SCNC: 5 MMOL/L — LOW (ref 7–14)
ANION GAP SERPL CALC-SCNC: 5 MMOL/L — LOW (ref 7–14)
AST SERPL-CCNC: 24 U/L — SIGNIFICANT CHANGE UP (ref 0–41)
AST SERPL-CCNC: 24 U/L — SIGNIFICANT CHANGE UP (ref 0–41)
BASOPHILS # BLD AUTO: 0.01 K/UL — SIGNIFICANT CHANGE UP (ref 0–0.2)
BASOPHILS # BLD AUTO: 0.01 K/UL — SIGNIFICANT CHANGE UP (ref 0–0.2)
BASOPHILS NFR BLD AUTO: 0.1 % — SIGNIFICANT CHANGE UP (ref 0–1)
BASOPHILS NFR BLD AUTO: 0.1 % — SIGNIFICANT CHANGE UP (ref 0–1)
BILIRUB SERPL-MCNC: 0.3 MG/DL — SIGNIFICANT CHANGE UP (ref 0.2–1.2)
BILIRUB SERPL-MCNC: 0.3 MG/DL — SIGNIFICANT CHANGE UP (ref 0.2–1.2)
BUN SERPL-MCNC: 31 MG/DL — HIGH (ref 10–20)
BUN SERPL-MCNC: 31 MG/DL — HIGH (ref 10–20)
CALCIUM SERPL-MCNC: 8.6 MG/DL — SIGNIFICANT CHANGE UP (ref 8.4–10.5)
CALCIUM SERPL-MCNC: 8.6 MG/DL — SIGNIFICANT CHANGE UP (ref 8.4–10.5)
CHLORIDE SERPL-SCNC: 94 MMOL/L — LOW (ref 98–110)
CHLORIDE SERPL-SCNC: 94 MMOL/L — LOW (ref 98–110)
CO2 SERPL-SCNC: 40 MMOL/L — HIGH (ref 17–32)
CO2 SERPL-SCNC: 40 MMOL/L — HIGH (ref 17–32)
CREAT SERPL-MCNC: 0.8 MG/DL — SIGNIFICANT CHANGE UP (ref 0.7–1.5)
CREAT SERPL-MCNC: 0.8 MG/DL — SIGNIFICANT CHANGE UP (ref 0.7–1.5)
EGFR: 80 ML/MIN/1.73M2 — SIGNIFICANT CHANGE UP
EGFR: 80 ML/MIN/1.73M2 — SIGNIFICANT CHANGE UP
EOSINOPHIL # BLD AUTO: 0 K/UL — SIGNIFICANT CHANGE UP (ref 0–0.7)
EOSINOPHIL # BLD AUTO: 0 K/UL — SIGNIFICANT CHANGE UP (ref 0–0.7)
EOSINOPHIL NFR BLD AUTO: 0 % — SIGNIFICANT CHANGE UP (ref 0–8)
EOSINOPHIL NFR BLD AUTO: 0 % — SIGNIFICANT CHANGE UP (ref 0–8)
GLUCOSE SERPL-MCNC: 118 MG/DL — HIGH (ref 70–99)
GLUCOSE SERPL-MCNC: 118 MG/DL — HIGH (ref 70–99)
HCT VFR BLD CALC: 42.4 % — SIGNIFICANT CHANGE UP (ref 37–47)
HCT VFR BLD CALC: 42.4 % — SIGNIFICANT CHANGE UP (ref 37–47)
HGB BLD-MCNC: 13.9 G/DL — SIGNIFICANT CHANGE UP (ref 12–16)
HGB BLD-MCNC: 13.9 G/DL — SIGNIFICANT CHANGE UP (ref 12–16)
IMM GRANULOCYTES NFR BLD AUTO: 0.5 % — HIGH (ref 0.1–0.3)
IMM GRANULOCYTES NFR BLD AUTO: 0.5 % — HIGH (ref 0.1–0.3)
LACTATE SERPL-SCNC: 1.5 MMOL/L — SIGNIFICANT CHANGE UP (ref 0.7–2)
LACTATE SERPL-SCNC: 1.5 MMOL/L — SIGNIFICANT CHANGE UP (ref 0.7–2)
LYMPHOCYTES # BLD AUTO: 0.6 K/UL — LOW (ref 1.2–3.4)
LYMPHOCYTES # BLD AUTO: 0.6 K/UL — LOW (ref 1.2–3.4)
LYMPHOCYTES # BLD AUTO: 5 % — LOW (ref 20.5–51.1)
LYMPHOCYTES # BLD AUTO: 5 % — LOW (ref 20.5–51.1)
MAGNESIUM SERPL-MCNC: 2.7 MG/DL — HIGH (ref 1.8–2.4)
MAGNESIUM SERPL-MCNC: 2.7 MG/DL — HIGH (ref 1.8–2.4)
MCHC RBC-ENTMCNC: 28 PG — SIGNIFICANT CHANGE UP (ref 27–31)
MCHC RBC-ENTMCNC: 28 PG — SIGNIFICANT CHANGE UP (ref 27–31)
MCHC RBC-ENTMCNC: 32.8 G/DL — SIGNIFICANT CHANGE UP (ref 32–37)
MCHC RBC-ENTMCNC: 32.8 G/DL — SIGNIFICANT CHANGE UP (ref 32–37)
MCV RBC AUTO: 85.5 FL — SIGNIFICANT CHANGE UP (ref 81–99)
MCV RBC AUTO: 85.5 FL — SIGNIFICANT CHANGE UP (ref 81–99)
MONOCYTES # BLD AUTO: 0.33 K/UL — SIGNIFICANT CHANGE UP (ref 0.1–0.6)
MONOCYTES # BLD AUTO: 0.33 K/UL — SIGNIFICANT CHANGE UP (ref 0.1–0.6)
MONOCYTES NFR BLD AUTO: 2.7 % — SIGNIFICANT CHANGE UP (ref 1.7–9.3)
MONOCYTES NFR BLD AUTO: 2.7 % — SIGNIFICANT CHANGE UP (ref 1.7–9.3)
NEUTROPHILS # BLD AUTO: 11.05 K/UL — HIGH (ref 1.4–6.5)
NEUTROPHILS # BLD AUTO: 11.05 K/UL — HIGH (ref 1.4–6.5)
NEUTROPHILS NFR BLD AUTO: 91.7 % — HIGH (ref 42.2–75.2)
NEUTROPHILS NFR BLD AUTO: 91.7 % — HIGH (ref 42.2–75.2)
NRBC # BLD: 0 /100 WBCS — SIGNIFICANT CHANGE UP (ref 0–0)
NRBC # BLD: 0 /100 WBCS — SIGNIFICANT CHANGE UP (ref 0–0)
PHOSPHATE SERPL-MCNC: 3.6 MG/DL — SIGNIFICANT CHANGE UP (ref 2.1–4.9)
PHOSPHATE SERPL-MCNC: 3.6 MG/DL — SIGNIFICANT CHANGE UP (ref 2.1–4.9)
PLATELET # BLD AUTO: 212 K/UL — SIGNIFICANT CHANGE UP (ref 130–400)
PLATELET # BLD AUTO: 212 K/UL — SIGNIFICANT CHANGE UP (ref 130–400)
PMV BLD: 10.3 FL — SIGNIFICANT CHANGE UP (ref 7.4–10.4)
PMV BLD: 10.3 FL — SIGNIFICANT CHANGE UP (ref 7.4–10.4)
POTASSIUM SERPL-MCNC: 5.4 MMOL/L — HIGH (ref 3.5–5)
POTASSIUM SERPL-MCNC: 5.4 MMOL/L — HIGH (ref 3.5–5)
POTASSIUM SERPL-SCNC: 5.4 MMOL/L — HIGH (ref 3.5–5)
POTASSIUM SERPL-SCNC: 5.4 MMOL/L — HIGH (ref 3.5–5)
PROT SERPL-MCNC: 6.4 G/DL — SIGNIFICANT CHANGE UP (ref 6–8)
PROT SERPL-MCNC: 6.4 G/DL — SIGNIFICANT CHANGE UP (ref 6–8)
RBC # BLD: 4.96 M/UL — SIGNIFICANT CHANGE UP (ref 4.2–5.4)
RBC # BLD: 4.96 M/UL — SIGNIFICANT CHANGE UP (ref 4.2–5.4)
RBC # FLD: 13 % — SIGNIFICANT CHANGE UP (ref 11.5–14.5)
RBC # FLD: 13 % — SIGNIFICANT CHANGE UP (ref 11.5–14.5)
SODIUM SERPL-SCNC: 139 MMOL/L — SIGNIFICANT CHANGE UP (ref 135–146)
SODIUM SERPL-SCNC: 139 MMOL/L — SIGNIFICANT CHANGE UP (ref 135–146)
WBC # BLD: 12.05 K/UL — HIGH (ref 4.8–10.8)
WBC # BLD: 12.05 K/UL — HIGH (ref 4.8–10.8)
WBC # FLD AUTO: 12.05 K/UL — HIGH (ref 4.8–10.8)
WBC # FLD AUTO: 12.05 K/UL — HIGH (ref 4.8–10.8)

## 2024-01-04 PROCEDURE — 99232 SBSQ HOSP IP/OBS MODERATE 35: CPT

## 2024-01-04 RX ORDER — AZITHROMYCIN 500 MG/1
500 TABLET, FILM COATED ORAL DAILY
Refills: 0 | Status: DISCONTINUED | OUTPATIENT
Start: 2024-01-04 | End: 2024-01-05

## 2024-01-04 RX ORDER — CHLORHEXIDINE GLUCONATE 213 G/1000ML
1 SOLUTION TOPICAL
Refills: 0 | Status: DISCONTINUED | OUTPATIENT
Start: 2024-01-04 | End: 2024-01-05

## 2024-01-04 RX ORDER — SODIUM ZIRCONIUM CYCLOSILICATE 10 G/10G
5 POWDER, FOR SUSPENSION ORAL ONCE
Refills: 0 | Status: COMPLETED | OUTPATIENT
Start: 2024-01-04 | End: 2024-01-04

## 2024-01-04 RX ORDER — ACETYLCYSTEINE 200 MG/ML
4 VIAL (ML) MISCELLANEOUS EVERY 6 HOURS
Refills: 0 | Status: DISCONTINUED | OUTPATIENT
Start: 2024-01-04 | End: 2024-01-05

## 2024-01-04 RX ORDER — ASCORBIC ACID 60 MG
500 TABLET,CHEWABLE ORAL
Refills: 0 | Status: DISCONTINUED | OUTPATIENT
Start: 2024-01-04 | End: 2024-01-05

## 2024-01-04 RX ORDER — GUAIFENESIN/DEXTROMETHORPHAN 600MG-30MG
10 TABLET, EXTENDED RELEASE 12 HR ORAL EVERY 6 HOURS
Refills: 0 | Status: DISCONTINUED | OUTPATIENT
Start: 2024-01-04 | End: 2024-01-05

## 2024-01-04 RX ORDER — ZINC SULFATE TAB 220 MG (50 MG ZINC EQUIVALENT) 220 (50 ZN) MG
220 TAB ORAL DAILY
Refills: 0 | Status: DISCONTINUED | OUTPATIENT
Start: 2024-01-04 | End: 2024-01-05

## 2024-01-04 RX ORDER — SODIUM CHLORIDE 0.65 %
1 AEROSOL, SPRAY (ML) NASAL EVERY 6 HOURS
Refills: 0 | Status: DISCONTINUED | OUTPATIENT
Start: 2024-01-04 | End: 2024-01-05

## 2024-01-04 RX ADMIN — Medication 100 MILLIGRAM(S): at 12:57

## 2024-01-04 RX ADMIN — Medication 1 PATCH: at 18:03

## 2024-01-04 RX ADMIN — Medication 1 TABLET(S): at 12:57

## 2024-01-04 RX ADMIN — Medication 10 MILLILITER(S): at 18:04

## 2024-01-04 RX ADMIN — PANTOPRAZOLE SODIUM 40 MILLIGRAM(S): 20 TABLET, DELAYED RELEASE ORAL at 08:54

## 2024-01-04 RX ADMIN — Medication 10 MILLILITER(S): at 12:57

## 2024-01-04 RX ADMIN — BUDESONIDE AND FORMOTEROL FUMARATE DIHYDRATE 2 PUFF(S): 160; 4.5 AEROSOL RESPIRATORY (INHALATION) at 08:55

## 2024-01-04 RX ADMIN — Medication 1 SPRAY(S): at 18:17

## 2024-01-04 RX ADMIN — Medication 10 MILLILITER(S): at 22:46

## 2024-01-04 RX ADMIN — BUDESONIDE AND FORMOTEROL FUMARATE DIHYDRATE 2 PUFF(S): 160; 4.5 AEROSOL RESPIRATORY (INHALATION) at 22:46

## 2024-01-04 RX ADMIN — Medication 3 MILLILITER(S): at 14:31

## 2024-01-04 RX ADMIN — Medication 1 TABLET(S): at 18:04

## 2024-01-04 RX ADMIN — Medication 100 MILLIGRAM(S): at 22:46

## 2024-01-04 RX ADMIN — AZITHROMYCIN 255 MILLIGRAM(S): 500 TABLET, FILM COATED ORAL at 22:47

## 2024-01-04 RX ADMIN — Medication 75 MILLIGRAM(S): at 18:04

## 2024-01-04 RX ADMIN — Medication 3 MILLILITER(S): at 08:24

## 2024-01-04 RX ADMIN — Medication 75 MILLIGRAM(S): at 05:54

## 2024-01-04 RX ADMIN — Medication 500 MILLIGRAM(S): at 18:04

## 2024-01-04 RX ADMIN — Medication 1 SPRAY(S): at 22:49

## 2024-01-04 RX ADMIN — Medication 1 TABLET(S): at 08:54

## 2024-01-04 RX ADMIN — ENOXAPARIN SODIUM 40 MILLIGRAM(S): 100 INJECTION SUBCUTANEOUS at 22:47

## 2024-01-04 RX ADMIN — SODIUM ZIRCONIUM CYCLOSILICATE 5 GRAM(S): 10 POWDER, FOR SUSPENSION ORAL at 18:25

## 2024-01-04 RX ADMIN — Medication 1 SPRAY(S): at 15:07

## 2024-01-04 RX ADMIN — Medication 10 MILLILITER(S): at 08:54

## 2024-01-04 RX ADMIN — Medication 40 MILLIGRAM(S): at 05:54

## 2024-01-04 RX ADMIN — ZINC SULFATE TAB 220 MG (50 MG ZINC EQUIVALENT) 220 MILLIGRAM(S): 220 (50 ZN) TAB at 15:07

## 2024-01-04 RX ADMIN — Medication 500 MILLIGRAM(S): at 12:57

## 2024-01-04 NOTE — PROGRESS NOTE ADULT - ASSESSMENT
Patient is 67 yo female with long term smoking, has not seen a physician for many years presenting with     COPD exacerbation likely secondary to Influenza A Virus   Acute hypoxic hypercarbic respiratory failure: Was on BIPAP>>NC   Sepsis present on admission (Tmax 101F + Source+ organ dysfunction)    Active Tobacco smoking   -CT Chest ruled out PE with scattered small nodules   -c/w Tamiflu, Steroids Duonebs and inhalers   Counselled on Tobacco cessation c/w Nicotine patch  -Pulmonary consult appreciated: O/P Scattered small nodules to document stability  -ID consult appreciated: c/w azithromycin only   -Goal to taper off supplemental oxygen : was 81% on RA today     DVT PPX: Lovenox   GI PPX: PPI   Full Code   Dispo: from Home   Pending Clinical improvement   Tapering off Supplemental oxygen            Patient is 69 yo female with long term smoking, has not seen a physician for many years presenting with     COPD exacerbation likely secondary to Influenza A Virus   Acute hypoxic hypercarbic respiratory failure: Was on BIPAP>>NC   Sepsis present on admission (Tmax 101F + Source+ organ dysfunction)    Active Tobacco smoking   -CT Chest ruled out PE with scattered small nodules   -c/w Tamiflu, Steroids Duonebs and inhalers   Counselled on Tobacco cessation c/w Nicotine patch  -Pulmonary consult appreciated: O/P Scattered small nodules to document stability  -ID consult appreciated: c/w azithromycin only   -Goal to taper off supplemental oxygen : was 81% on RA today     DVT PPX: Lovenox   GI PPX: PPI   Full Code   Dispo: from Home   Pending Clinical improvement   Tapering off Supplemental oxygen

## 2024-01-04 NOTE — PROGRESS NOTE ADULT - SUBJECTIVE AND OBJECTIVE BOX
Patient is a 68y old  Female who presents with a chief complaint of SOB/Cough (03 Jan 2024 15:52)      MEDICATIONS  (STANDING):  acetylcysteine 20%  Inhalation 4 milliLiter(s) Inhalation every 6 hours  albuterol/ipratropium for Nebulization 3 milliLiter(s) Nebulizer every 6 hours  ascorbic acid 500 milliGRAM(s) Oral two times a day  azithromycin  IVPB 500 milliGRAM(s) IV Intermittent every 24 hours  benzonatate 100 milliGRAM(s) Oral every 8 hours  budesonide 160 MICROgram(s)/formoterol 4.5 MICROgram(s) Inhaler 2 Puff(s) Inhalation two times a day  chlorhexidine 2% Cloths 1 Application(s) Topical <User Schedule>  enoxaparin Injectable 40 milliGRAM(s) SubCutaneous every 24 hours  guaifenesin/dextromethorphan Oral Liquid 10 milliLiter(s) Oral every 6 hours  influenza  Vaccine (HIGH DOSE) 0.7 milliLiter(s) IntraMuscular once  lactobacillus acidophilus 1 Tablet(s) Oral three times a day with meals  nicotine - 21 mG/24Hr(s) Patch 21 Patch Transdermal daily  oseltamivir 75 milliGRAM(s) Oral two times a day  pantoprazole    Tablet 40 milliGRAM(s) Oral before breakfast  sodium chloride 0.65% Nasal 1 Spray(s) Both Nostrils every 6 hours  sodium zirconium cyclosilicate 5 Gram(s) Oral once  zinc sulfate 220 milliGRAM(s) Oral daily    MEDICATIONS  (PRN):  acetaminophen     Tablet .. 650 milliGRAM(s) Oral every 6 hours PRN Temp greater or equal to 38C (100.4F), Mild Pain (1 - 3)  albuterol    90 MICROgram(s) HFA Inhaler 2 Puff(s) Inhalation every 4 hours PRN Shortness of Breath and/or Wheezing  ondansetron Injectable 4 milliGRAM(s) IV Push every 6 hours PRN Nausea  senna 2 Tablet(s) Oral at bedtime PRN Constipation      CAPILLARY BLOOD GLUCOSE  I&O's Summary      PHYSICAL EXAM:  Vital Signs Last 24 Hrs  T(C): 36.7 (04 Jan 2024 05:37), Max: 36.7 (04 Jan 2024 05:37)  T(F): 98.1 (04 Jan 2024 05:37), Max: 98.1 (04 Jan 2024 05:37)  HR: 73 (04 Jan 2024 05:37) (73 - 83)  BP: 125/74 (04 Jan 2024 05:37) (125/74 - 163/74)  BP(mean): --  RR: 20 (04 Jan 2024 08:25) (18 - 20)  SpO2: 92% (04 Jan 2024 08:25) (81% - 97%)    Parameters below as of 04 Jan 2024 08:25  Patient On (Oxygen Delivery Method): nasal cannula  O2 Flow (L/min): 2    GENERAL: No acute distress, well-developed  HEAD:  Atraumatic, Normocephalic  EYES: EOMI, PERRLA, conjunctiva and sclera clear  NECK: Supple, no lymphadenopathy, no JVD  CHEST/LUNG: B/L inspiratory and expiratory wheezing improving   HEART: Regular rate and rhythm; No murmurs, rubs, or gallops  ABDOMEN: Soft, non-tender, non-distended; normal bowel sounds,  EXTREMITIES:  2+ peripheral pulses b/l, No clubbing, cyanosis, or edema  NEUROLOGY: A&O x 3, no focal deficits  SKIN: No rashes or lesions      LABS:                        13.9   12.05 )-----------( 212      ( 04 Jan 2024 08:44 )             42.4     01-04    139  |  94<L>  |  31<H>  ----------------------------<  118<H>  5.4<H>   |  40<H>  |  0.8    Ca    8.6      04 Jan 2024 08:44  Phos  3.6     01-04  Mg     2.7     01-04    TPro  6.4  /  Alb  3.9  /  TBili  0.3  /  DBili  x   /  AST  24  /  ALT  28  /  AlkPhos  64  01-04          Urinalysis Basic - ( 04 Jan 2024 08:44 )    Color: x / Appearance: x / SG: x / pH: x  Gluc: 118 mg/dL / Ketone: x  / Bili: x / Urobili: x   Blood: x / Protein: x / Nitrite: x   Leuk Esterase: x / RBC: x / WBC x   Sq Epi: x / Non Sq Epi: x / Bacteria: x        Culture - Blood (collected 01 Jan 2024 20:22)  Source: .Blood Blood  Preliminary Report (04 Jan 2024 06:01):    No growth at 48 Hours    Culture - Blood (collected 01 Jan 2024 20:22)  Source: .Blood Blood  Preliminary Report (04 Jan 2024 06:01):    No growth at 48 Hours

## 2024-01-05 ENCOUNTER — TRANSCRIPTION ENCOUNTER (OUTPATIENT)
Age: 69
End: 2024-01-05

## 2024-01-05 VITALS
RESPIRATION RATE: 18 BRPM | TEMPERATURE: 96 F | DIASTOLIC BLOOD PRESSURE: 74 MMHG | HEART RATE: 90 BPM | SYSTOLIC BLOOD PRESSURE: 143 MMHG

## 2024-01-05 LAB
ALBUMIN SERPL ELPH-MCNC: 3.8 G/DL — SIGNIFICANT CHANGE UP (ref 3.5–5.2)
ALBUMIN SERPL ELPH-MCNC: 3.8 G/DL — SIGNIFICANT CHANGE UP (ref 3.5–5.2)
ALP SERPL-CCNC: 53 U/L — SIGNIFICANT CHANGE UP (ref 30–115)
ALP SERPL-CCNC: 53 U/L — SIGNIFICANT CHANGE UP (ref 30–115)
ALT FLD-CCNC: 25 U/L — SIGNIFICANT CHANGE UP (ref 0–41)
ALT FLD-CCNC: 25 U/L — SIGNIFICANT CHANGE UP (ref 0–41)
ANION GAP SERPL CALC-SCNC: 4 MMOL/L — LOW (ref 7–14)
ANION GAP SERPL CALC-SCNC: 4 MMOL/L — LOW (ref 7–14)
AST SERPL-CCNC: 16 U/L — SIGNIFICANT CHANGE UP (ref 0–41)
AST SERPL-CCNC: 16 U/L — SIGNIFICANT CHANGE UP (ref 0–41)
BASOPHILS # BLD AUTO: 0.02 K/UL — SIGNIFICANT CHANGE UP (ref 0–0.2)
BASOPHILS # BLD AUTO: 0.02 K/UL — SIGNIFICANT CHANGE UP (ref 0–0.2)
BASOPHILS NFR BLD AUTO: 0.2 % — SIGNIFICANT CHANGE UP (ref 0–1)
BASOPHILS NFR BLD AUTO: 0.2 % — SIGNIFICANT CHANGE UP (ref 0–1)
BILIRUB SERPL-MCNC: 0.4 MG/DL — SIGNIFICANT CHANGE UP (ref 0.2–1.2)
BILIRUB SERPL-MCNC: 0.4 MG/DL — SIGNIFICANT CHANGE UP (ref 0.2–1.2)
BUN SERPL-MCNC: 27 MG/DL — HIGH (ref 10–20)
BUN SERPL-MCNC: 27 MG/DL — HIGH (ref 10–20)
CALCIUM SERPL-MCNC: 8.7 MG/DL — SIGNIFICANT CHANGE UP (ref 8.4–10.5)
CALCIUM SERPL-MCNC: 8.7 MG/DL — SIGNIFICANT CHANGE UP (ref 8.4–10.5)
CHLORIDE SERPL-SCNC: 94 MMOL/L — LOW (ref 98–110)
CHLORIDE SERPL-SCNC: 94 MMOL/L — LOW (ref 98–110)
CO2 SERPL-SCNC: 39 MMOL/L — HIGH (ref 17–32)
CO2 SERPL-SCNC: 39 MMOL/L — HIGH (ref 17–32)
CREAT SERPL-MCNC: 0.7 MG/DL — SIGNIFICANT CHANGE UP (ref 0.7–1.5)
CREAT SERPL-MCNC: 0.7 MG/DL — SIGNIFICANT CHANGE UP (ref 0.7–1.5)
EGFR: 94 ML/MIN/1.73M2 — SIGNIFICANT CHANGE UP
EGFR: 94 ML/MIN/1.73M2 — SIGNIFICANT CHANGE UP
EOSINOPHIL # BLD AUTO: 0 K/UL — SIGNIFICANT CHANGE UP (ref 0–0.7)
EOSINOPHIL # BLD AUTO: 0 K/UL — SIGNIFICANT CHANGE UP (ref 0–0.7)
EOSINOPHIL NFR BLD AUTO: 0 % — SIGNIFICANT CHANGE UP (ref 0–8)
EOSINOPHIL NFR BLD AUTO: 0 % — SIGNIFICANT CHANGE UP (ref 0–8)
GLUCOSE SERPL-MCNC: 109 MG/DL — HIGH (ref 70–99)
GLUCOSE SERPL-MCNC: 109 MG/DL — HIGH (ref 70–99)
HCT VFR BLD CALC: 46 % — SIGNIFICANT CHANGE UP (ref 37–47)
HCT VFR BLD CALC: 46 % — SIGNIFICANT CHANGE UP (ref 37–47)
HGB BLD-MCNC: 15 G/DL — SIGNIFICANT CHANGE UP (ref 12–16)
HGB BLD-MCNC: 15 G/DL — SIGNIFICANT CHANGE UP (ref 12–16)
IMM GRANULOCYTES NFR BLD AUTO: 0.4 % — HIGH (ref 0.1–0.3)
IMM GRANULOCYTES NFR BLD AUTO: 0.4 % — HIGH (ref 0.1–0.3)
LYMPHOCYTES # BLD AUTO: 0.6 K/UL — LOW (ref 1.2–3.4)
LYMPHOCYTES # BLD AUTO: 0.6 K/UL — LOW (ref 1.2–3.4)
LYMPHOCYTES # BLD AUTO: 6.7 % — LOW (ref 20.5–51.1)
LYMPHOCYTES # BLD AUTO: 6.7 % — LOW (ref 20.5–51.1)
MCHC RBC-ENTMCNC: 27.8 PG — SIGNIFICANT CHANGE UP (ref 27–31)
MCHC RBC-ENTMCNC: 27.8 PG — SIGNIFICANT CHANGE UP (ref 27–31)
MCHC RBC-ENTMCNC: 32.6 G/DL — SIGNIFICANT CHANGE UP (ref 32–37)
MCHC RBC-ENTMCNC: 32.6 G/DL — SIGNIFICANT CHANGE UP (ref 32–37)
MCV RBC AUTO: 85.3 FL — SIGNIFICANT CHANGE UP (ref 81–99)
MCV RBC AUTO: 85.3 FL — SIGNIFICANT CHANGE UP (ref 81–99)
MONOCYTES # BLD AUTO: 0.26 K/UL — SIGNIFICANT CHANGE UP (ref 0.1–0.6)
MONOCYTES # BLD AUTO: 0.26 K/UL — SIGNIFICANT CHANGE UP (ref 0.1–0.6)
MONOCYTES NFR BLD AUTO: 2.9 % — SIGNIFICANT CHANGE UP (ref 1.7–9.3)
MONOCYTES NFR BLD AUTO: 2.9 % — SIGNIFICANT CHANGE UP (ref 1.7–9.3)
NEUTROPHILS # BLD AUTO: 8.06 K/UL — HIGH (ref 1.4–6.5)
NEUTROPHILS # BLD AUTO: 8.06 K/UL — HIGH (ref 1.4–6.5)
NEUTROPHILS NFR BLD AUTO: 89.8 % — HIGH (ref 42.2–75.2)
NEUTROPHILS NFR BLD AUTO: 89.8 % — HIGH (ref 42.2–75.2)
NRBC # BLD: 0 /100 WBCS — SIGNIFICANT CHANGE UP (ref 0–0)
NRBC # BLD: 0 /100 WBCS — SIGNIFICANT CHANGE UP (ref 0–0)
PLATELET # BLD AUTO: 213 K/UL — SIGNIFICANT CHANGE UP (ref 130–400)
PLATELET # BLD AUTO: 213 K/UL — SIGNIFICANT CHANGE UP (ref 130–400)
PMV BLD: 9.8 FL — SIGNIFICANT CHANGE UP (ref 7.4–10.4)
PMV BLD: 9.8 FL — SIGNIFICANT CHANGE UP (ref 7.4–10.4)
POTASSIUM SERPL-MCNC: 5.6 MMOL/L — HIGH (ref 3.5–5)
POTASSIUM SERPL-MCNC: 5.6 MMOL/L — HIGH (ref 3.5–5)
POTASSIUM SERPL-SCNC: 5.6 MMOL/L — HIGH (ref 3.5–5)
POTASSIUM SERPL-SCNC: 5.6 MMOL/L — HIGH (ref 3.5–5)
PROT SERPL-MCNC: 6.3 G/DL — SIGNIFICANT CHANGE UP (ref 6–8)
PROT SERPL-MCNC: 6.3 G/DL — SIGNIFICANT CHANGE UP (ref 6–8)
RBC # BLD: 5.39 M/UL — SIGNIFICANT CHANGE UP (ref 4.2–5.4)
RBC # BLD: 5.39 M/UL — SIGNIFICANT CHANGE UP (ref 4.2–5.4)
RBC # FLD: 13.2 % — SIGNIFICANT CHANGE UP (ref 11.5–14.5)
RBC # FLD: 13.2 % — SIGNIFICANT CHANGE UP (ref 11.5–14.5)
SODIUM SERPL-SCNC: 137 MMOL/L — SIGNIFICANT CHANGE UP (ref 135–146)
SODIUM SERPL-SCNC: 137 MMOL/L — SIGNIFICANT CHANGE UP (ref 135–146)
WBC # BLD: 8.98 K/UL — SIGNIFICANT CHANGE UP (ref 4.8–10.8)
WBC # BLD: 8.98 K/UL — SIGNIFICANT CHANGE UP (ref 4.8–10.8)
WBC # FLD AUTO: 8.98 K/UL — SIGNIFICANT CHANGE UP (ref 4.8–10.8)
WBC # FLD AUTO: 8.98 K/UL — SIGNIFICANT CHANGE UP (ref 4.8–10.8)

## 2024-01-05 PROCEDURE — 99239 HOSP IP/OBS DSCHRG MGMT >30: CPT

## 2024-01-05 RX ORDER — SENNA PLUS 8.6 MG/1
2 TABLET ORAL
Qty: 60 | Refills: 0
Start: 2024-01-05 | End: 2024-02-03

## 2024-01-05 RX ORDER — BUDESONIDE AND FORMOTEROL FUMARATE DIHYDRATE 160; 4.5 UG/1; UG/1
2 AEROSOL RESPIRATORY (INHALATION)
Qty: 1 | Refills: 0
Start: 2024-01-05 | End: 2024-02-03

## 2024-01-05 RX ORDER — ALBUTEROL 90 UG/1
2 AEROSOL, METERED ORAL
Qty: 100 | Refills: 0
Start: 2024-01-05 | End: 2024-01-09

## 2024-01-05 RX ORDER — NICOTINE POLACRILEX 2 MG
1 GUM BUCCAL
Qty: 14 | Refills: 0
Start: 2024-01-05 | End: 2024-01-18

## 2024-01-05 RX ORDER — LACTOBACILLUS ACIDOPHILUS 100MM CELL
1 CAPSULE ORAL
Qty: 120 | Refills: 0
Start: 2024-01-05 | End: 2024-03-04

## 2024-01-05 RX ORDER — GUAIFENESIN/DEXTROMETHORPHAN 600MG-30MG
10 TABLET, EXTENDED RELEASE 12 HR ORAL
Qty: 200 | Refills: 0
Start: 2024-01-05 | End: 2024-01-09

## 2024-01-05 RX ORDER — ZINC SULFATE TAB 220 MG (50 MG ZINC EQUIVALENT) 220 (50 ZN) MG
1 TAB ORAL
Qty: 30 | Refills: 0
Start: 2024-01-05 | End: 2024-02-03

## 2024-01-05 RX ORDER — AZITHROMYCIN 500 MG/1
1 TABLET, FILM COATED ORAL
Qty: 1 | Refills: 0
Start: 2024-01-05 | End: 2024-01-05

## 2024-01-05 RX ORDER — PANTOPRAZOLE SODIUM 20 MG/1
1 TABLET, DELAYED RELEASE ORAL
Qty: 30 | Refills: 0
Start: 2024-01-05 | End: 2024-02-03

## 2024-01-05 RX ORDER — ASCORBIC ACID 60 MG
1 TABLET,CHEWABLE ORAL
Qty: 60 | Refills: 0
Start: 2024-01-05 | End: 2024-02-03

## 2024-01-05 RX ADMIN — Medication 100 MILLIGRAM(S): at 12:09

## 2024-01-05 RX ADMIN — Medication 1 SPRAY(S): at 12:09

## 2024-01-05 RX ADMIN — Medication 500 MILLIGRAM(S): at 05:15

## 2024-01-05 RX ADMIN — Medication 100 MILLIGRAM(S): at 05:16

## 2024-01-05 RX ADMIN — Medication 10 MILLILITER(S): at 05:17

## 2024-01-05 RX ADMIN — Medication 1 PATCH: at 09:02

## 2024-01-05 RX ADMIN — Medication 50 MILLIGRAM(S): at 05:15

## 2024-01-05 RX ADMIN — Medication 10 MILLILITER(S): at 12:09

## 2024-01-05 RX ADMIN — AZITHROMYCIN 500 MILLIGRAM(S): 500 TABLET, FILM COATED ORAL at 10:26

## 2024-01-05 RX ADMIN — Medication 1 TABLET(S): at 09:00

## 2024-01-05 RX ADMIN — Medication 1 TABLET(S): at 12:09

## 2024-01-05 RX ADMIN — Medication 4 MILLILITER(S): at 07:52

## 2024-01-05 RX ADMIN — Medication 1 SPRAY(S): at 05:17

## 2024-01-05 RX ADMIN — ZINC SULFATE TAB 220 MG (50 MG ZINC EQUIVALENT) 220 MILLIGRAM(S): 220 (50 ZN) TAB at 09:00

## 2024-01-05 RX ADMIN — Medication 75 MILLIGRAM(S): at 05:15

## 2024-01-05 RX ADMIN — PANTOPRAZOLE SODIUM 40 MILLIGRAM(S): 20 TABLET, DELAYED RELEASE ORAL at 05:16

## 2024-01-05 RX ADMIN — Medication 3 MILLILITER(S): at 07:52

## 2024-01-05 NOTE — DISCHARGE NOTE PROVIDER - CARE PROVIDER_API CALL
Saroj Alonso  Pulmonary Disease  [address] [place] [address]  Phone: [phone]  Fax: [phone]  Follow Up Time:     Ellen Saeed  Internal Medicine  [address] [place] [address]  Phone: [phone]  Fax: [phone]  Follow Up Time:    Saroj Alonso  Pulmonary Disease  [address] [place] [address]  Phone: [phone]  Fax: [phone]  Follow Up Time:     Ellen Saeed  Internal Medicine  [address] [place] [address]  Phone: [phone]  Fax: [phone]  Follow Up Time:

## 2024-01-05 NOTE — DISCHARGE NOTE PROVIDER - NSDCCPCAREPLAN_GEN_ALL_CORE_FT
PRINCIPAL DISCHARGE DIAGNOSIS  Diagnosis: Acute on chronic respiratory failure with hypoxia and hypercapnia  Assessment and Plan of Treatment: Secondary to COPD exacebation and Influenza A Virus      SECONDARY DISCHARGE DIAGNOSES  Diagnosis: COPD exacerbation  Assessment and Plan of Treatment: Cristela from Infuenza Virus, with a mixture of Bacterial Pneumonia. complete your tamiflu and antibiotics as as prescribed and follow-up with the pulmonlogist in 1-2 weeks    Diagnosis: Influenza A  Assessment and Plan of Treatment: Complete your Tamiflu as prescribed    Diagnosis: Pulmonary nodules  Assessment and Plan of Treatment: You will need a repeat CT scan of your chest in 12months to evalute these nodules so see your pulmonologist

## 2024-01-05 NOTE — DISCHARGE NOTE NURSING/CASE MANAGEMENT/SOCIAL WORK - PATIENT PORTAL LINK FT
You can access the FollowMyHealth Patient Portal offered by [facility] by registering at the following website: [email or web address] By joining [email or web address] FollowMyHealth portal, you will also be able to view your health information using other applications (apps) compatible with our system. You can access the FollowMyHealth Patient Portal offered by [facility] by registering at the following website: [email or web address]. By joining [employer]’s FollowMyHealth portal, you will also be able to view your health information using other applications (apps) compatible with our system.

## 2024-01-05 NOTE — DISCHARGE NOTE PROVIDER - NSDCMRMEDTOKEN_GEN_ALL_CORE_FT
albuterol 90 mcg/inh inhalation aerosol: 2 puff(s) inhaled every 8 hours as needed for Shortness of Breath and/or Wheezing  ascorbic acid 500 mg oral tablet: 1 tab(s) orally 2 times a day  azithromycin 500 mg oral tablet: 1 tab(s) orally once a day Take n 1/6  benzonatate 100 mg oral capsule: 1 cap(s) orally every 8 hours  budesonide-formoterol 160 mcg-4.5 mcg/inh inhalation aerosol: inhaled 2 times a day  guaiFENesin-dextromethorphan 100 mg-10 mg/5 mL oral liquid: 10 milliliter(s) orally every 6 hours  lactobacillus acidophilus oral capsule: 1 tab(s) orally 3 times a day  nicotine 21 mg/24 hr transdermal film, extended release: 1 film(s) transdermal once a day  oseltamivir 75 mg oral capsule: 1 cap(s) orally 2 times a day Take only one pill tonight then complete dose as precribed  pantoprazole 40 mg oral delayed release tablet: 1 tab(s) orally once a day (before a meal)  predniSONE 20 mg oral tablet: 2 tab(s) orally once a day  senna leaf extract oral tablet: 2 tab(s) orally once a day (at bedtime) as needed for Constipation  zinc sulfate 220 mg oral capsule: 1 cap(s) orally once a day

## 2024-01-05 NOTE — DISCHARGE NOTE PROVIDER - CARE PROVIDERS DIRECT ADDRESSES
[email or web address]

## 2024-01-05 NOTE — DISCHARGE NOTE PROVIDER - HOSPITAL COURSE
Patient is 67 yo female with long term smoking, has not seen a physician for many years presenting with     COPD exacerbation likely secondary to Influenza A Virus   Acute hypoxic hypercarbic respiratory failure: Was on BIPAP>>NC   Sepsis present on admission (Tmax 101F + Source+ organ dysfunction)    Active Tobacco smoking   -CT Chest ruled out PE with scattered small nodules   -c/w Tamiflu, Steroids Duonebs and inhalers   Counselled on Tobacco cessation c/w Nicotine patch  -Pulmonary consult appreciated: O/P Scattered small nodules to document stability  -ID consult appreciated: c/w azithromycin only   -Goal to taper off supplemental oxygen : was 81% on RA today     DVT PPX: Lovenox   GI PPX: PPI   Full Code   Dispo: from Home   Pending Clinical improvement   Tapering off Supplemental oxygen

## 2024-01-05 NOTE — DISCHARGE NOTE PROVIDER - ATTENDING DISCHARGE PHYSICAL EXAMINATION:
GENERAL: No acute distress, well-developed  HEAD:  Atraumatic, Normocephalic  EYES: EOMI, PERRLA, conjunctiva and sclera clear  NECK: Supple, no lymphadenopathy, no JVD  CHEST/LUNG: B/L inspiratory and expiratory wheezing improving   HEART: Regular rate and rhythm; No murmurs, rubs, or gallops  ABDOMEN: Soft, non-tender, non-distended; normal bowel sounds,  EXTREMITIES:  2+ peripheral pulses b/l, No clubbing, cyanosis, or edema  NEUROLOGY: A&O x 3, no focal deficits  SKIN: No rashes or lesions

## 2024-01-05 NOTE — CHART NOTE - NSCHARTNOTEFT_GEN_A_CORE
Despite IV steroids and bronchodilators patient desaturates to:    - Room air pulse ox. at rest:  95%    - Room air pulse ox while ambulatin%    - Pulse ox while ambulating on 2 liters O2 95%    Patient will require home O2 for discharge.  Patient is aware and agreeable to home O2.  Patient is in a chronic stable state of COPD.      Patient treated for pneumonia: N    Pneumonia treated and resolved: N
Pt refuses iv zithromax  PA Note:    Called to evaluate Patient for     T(C): 36.6 (01-04-24 @ 20:49), Max: 36.7 (01-04-24 @ 05:37)  HR: 88 (01-04-24 @ 20:49) (73 - 88)  BP: 147/72 (01-04-24 @ 20:49) (125/74 - 163/85)  RR: 18 (01-04-24 @ 20:49) (18 - 20)  SpO2: 98% (01-04-24 @ 13:18) (81% - 98%)    68yFemale    PHYSICAL EXAM:      Constitutional: NAD, A&O x3    Eyes: PERRLA, no conjuctivitis    Neck: no lymphadenopathy    Respiratory: +air entry, no rales, no rhonchi, no wheezes    Cardiovascular: +S1 and S2, regular rate and rhythm    Gastrointestinal: +BS, soft, non-tender, not distended    Extremities:  no edema, no calf tenderness    Skin: no rashes, normal turgor                            13.9   12.05 )-----------( 212      ( 04 Jan 2024 08:44 )             42.4     01-04    139  |  94<L>  |  31<H>  ----------------------------<  118<H>  5.4<H>   |  40<H>  |  0.8    Ca    8.6      04 Jan 2024 08:44  Phos  3.6     01-04  Mg     2.7     01-04    TPro  6.4  /  Alb  3.9  /  TBili  0.3  /  DBili  x   /  AST  24  /  ALT  28  /  AlkPhos  64  01-04          Urinalysis Basic - ( 04 Jan 2024 08:44 )    Color: x / Appearance: x / SG: x / pH: x  Gluc: 118 mg/dL / Ketone: x  / Bili: x / Urobili: x   Blood: x / Protein: x / Nitrite: x   Leuk Esterase: x / RBC: x / WBC x   Sq Epi: x / Non Sq Epi: x / Bacteria: x            CAPILLARY BLOOD GLUCOSE          Culture - Blood (collected 01-01-24 @ 20:22)  Source: .Blood Blood  Preliminary Report (01-04-24 @ 06:01):    No growth at 48 Hours    Culture - Blood (collected 01-01-24 @ 20:22)  Source: .Blood Blood  Preliminary Report (01-04-24 @ 06:01):    No growth at 48 Hours              Assessment: change iv to po zithromax as per patient request refusing medication otherwise        Plan:  po zithromax    Notified:
as per ID Nelson Mcdermott T can d/c rocephin and c/w christen and tamiflu

## 2024-01-05 NOTE — DISCHARGE NOTE PROVIDER - PROVIDER TOKENS
PROVIDER:[TOKEN:[[id number]]],PROVIDER:[TOKEN:[[id number]]] PROVIDER:[TOKEN:[[id number]]],PROVIDER:[TOKEN:[[id number]]]

## 2024-01-05 NOTE — DISCHARGE NOTE PROVIDER - NSDCFUADDINST_GEN_ALL_CORE_FT
It is my  recommendation that you stay home and get some rest for a month   by this time you should have followed up with your new Pulmonologist for further recommendation and management .   If your Job provides Virtual Option for work then it would be my recommendation to work from home

## 2024-01-05 NOTE — DISCHARGE NOTE NURSING/CASE MANAGEMENT/SOCIAL WORK - NSDCPEFALRISK_GEN_ALL_CORE
For information on Fall & Injury Prevention, visit: [email or web address] OR  [email or web address] OR  https://www.cdc.gov/steadi/patient.html For information on Fall & Injury Prevention, visit: [email or web address] OR  [email or web address] OR  https://www.cdc.gov/steadi/patient.html

## 2024-01-07 LAB
CULTURE RESULTS: SIGNIFICANT CHANGE UP
SPECIMEN SOURCE: SIGNIFICANT CHANGE UP

## 2024-01-10 DIAGNOSIS — A41.89 OTHER SPECIFIED SEPSIS: ICD-10-CM

## 2024-01-10 DIAGNOSIS — J96.22 ACUTE AND CHRONIC RESPIRATORY FAILURE WITH HYPERCAPNIA: ICD-10-CM

## 2024-01-10 DIAGNOSIS — K04.7 PERIAPICAL ABSCESS WITHOUT SINUS: ICD-10-CM

## 2024-01-10 DIAGNOSIS — J15.9 UNSPECIFIED BACTERIAL PNEUMONIA: ICD-10-CM

## 2024-01-10 DIAGNOSIS — R91.1 SOLITARY PULMONARY NODULE: ICD-10-CM

## 2024-01-10 DIAGNOSIS — J20.8 ACUTE BRONCHITIS DUE TO OTHER SPECIFIED ORGANISMS: ICD-10-CM

## 2024-01-10 DIAGNOSIS — J10.1 INFLUENZA DUE TO OTHER IDENTIFIED INFLUENZA VIRUS WITH OTHER RESPIRATORY MANIFESTATIONS: ICD-10-CM

## 2024-01-10 DIAGNOSIS — F17.210 NICOTINE DEPENDENCE, CIGARETTES, UNCOMPLICATED: ICD-10-CM

## 2024-01-10 DIAGNOSIS — J44.0 CHRONIC OBSTRUCTIVE PULMONARY DISEASE WITH (ACUTE) LOWER RESPIRATORY INFECTION: ICD-10-CM

## 2024-01-10 DIAGNOSIS — J96.21 ACUTE AND CHRONIC RESPIRATORY FAILURE WITH HYPOXIA: ICD-10-CM

## 2024-01-17 PROBLEM — F17.200 NICOTINE DEPENDENCE, UNSPECIFIED, UNCOMPLICATED: Chronic | Status: ACTIVE | Noted: 2024-01-02

## 2024-02-22 ENCOUNTER — TRANSCRIPTION ENCOUNTER (OUTPATIENT)
Age: 69
End: 2024-02-22

## 2024-03-04 ENCOUNTER — APPOINTMENT (OUTPATIENT)
Dept: INTERNAL MEDICINE | Facility: CLINIC | Age: 69
End: 2024-03-04

## 2024-03-04 PROBLEM — Z00.00 ENCOUNTER FOR PREVENTIVE HEALTH EXAMINATION: Status: ACTIVE | Noted: 2024-03-04

## 2024-04-28 NOTE — ED PROVIDER NOTE - ATTENDING APP SHARED VISIT CONTRIBUTION OF CARE
I reviewed and verified the documentation and  and independently performed the documented  MDM. Contraindicated [Appropriate medications for patient's presenting complaints were ordered and effects were reassessed].   [Patient's external records were reviewed]. [Additional history was obtained from family].  Escalation to admission was considered.  At this time, patient requires inpatient hospitalization [- monitored setting].

## 2024-12-17 ENCOUNTER — APPOINTMENT (OUTPATIENT)
Dept: PULMONOLOGY | Facility: CLINIC | Age: 69
End: 2024-12-17

## 2024-12-29 ENCOUNTER — INPATIENT (INPATIENT)
Facility: HOSPITAL | Age: 69
LOS: 1 days | Discharge: HOME | DRG: 192 | End: 2024-12-31
Attending: INTERNAL MEDICINE | Admitting: INTERNAL MEDICINE
Payer: COMMERCIAL

## 2024-12-29 VITALS
OXYGEN SATURATION: 93 % | WEIGHT: 154.98 LBS | HEIGHT: 67 IN | TEMPERATURE: 99 F | SYSTOLIC BLOOD PRESSURE: 214 MMHG | DIASTOLIC BLOOD PRESSURE: 86 MMHG | HEART RATE: 112 BPM | RESPIRATION RATE: 24 BRPM

## 2024-12-29 DIAGNOSIS — J44.9 CHRONIC OBSTRUCTIVE PULMONARY DISEASE, UNSPECIFIED: ICD-10-CM

## 2024-12-29 DIAGNOSIS — Z98.890 OTHER SPECIFIED POSTPROCEDURAL STATES: Chronic | ICD-10-CM

## 2024-12-29 LAB
ALBUMIN SERPL ELPH-MCNC: 3.8 G/DL — SIGNIFICANT CHANGE UP (ref 3.5–5.2)
ALP SERPL-CCNC: 80 U/L — SIGNIFICANT CHANGE UP (ref 30–115)
ALT FLD-CCNC: 10 U/L — SIGNIFICANT CHANGE UP (ref 0–41)
ANION GAP SERPL CALC-SCNC: 13 MMOL/L — SIGNIFICANT CHANGE UP (ref 7–14)
AST SERPL-CCNC: 16 U/L — SIGNIFICANT CHANGE UP (ref 0–41)
BASE EXCESS BLDV CALC-SCNC: 9.8 MMOL/L — HIGH (ref -2–3)
BASOPHILS # BLD AUTO: 0.11 K/UL — SIGNIFICANT CHANGE UP (ref 0–0.2)
BASOPHILS NFR BLD AUTO: 1.2 % — HIGH (ref 0–1)
BILIRUB SERPL-MCNC: 0.5 MG/DL — SIGNIFICANT CHANGE UP (ref 0.2–1.2)
BUN SERPL-MCNC: 20 MG/DL — SIGNIFICANT CHANGE UP (ref 10–20)
CA-I SERPL-SCNC: 1.11 MMOL/L — LOW (ref 1.15–1.33)
CALCIUM SERPL-MCNC: 8.8 MG/DL — SIGNIFICANT CHANGE UP (ref 8.4–10.5)
CHLORIDE SERPL-SCNC: 95 MMOL/L — LOW (ref 98–110)
CO2 SERPL-SCNC: 32 MMOL/L — SIGNIFICANT CHANGE UP (ref 17–32)
CREAT SERPL-MCNC: 0.7 MG/DL — SIGNIFICANT CHANGE UP (ref 0.7–1.5)
EGFR: 93 ML/MIN/1.73M2 — SIGNIFICANT CHANGE UP
EOSINOPHIL # BLD AUTO: 0.05 K/UL — SIGNIFICANT CHANGE UP (ref 0–0.7)
EOSINOPHIL NFR BLD AUTO: 0.5 % — SIGNIFICANT CHANGE UP (ref 0–8)
FLUAV AG NPH QL: SIGNIFICANT CHANGE UP
FLUBV AG NPH QL: SIGNIFICANT CHANGE UP
GAS PNL BLDV: 135 MMOL/L — LOW (ref 136–145)
GAS PNL BLDV: SIGNIFICANT CHANGE UP
GLUCOSE SERPL-MCNC: 103 MG/DL — HIGH (ref 70–99)
HCO3 BLDV-SCNC: 39 MMOL/L — HIGH (ref 22–29)
HCT VFR BLD CALC: 48.9 % — HIGH (ref 37–47)
HCT VFR BLDA CALC: 50 % — HIGH (ref 34.5–46.5)
HGB BLD CALC-MCNC: 16.6 G/DL — HIGH (ref 11.7–16.1)
HGB BLD-MCNC: 16.1 G/DL — HIGH (ref 12–16)
IMM GRANULOCYTES NFR BLD AUTO: 0.3 % — SIGNIFICANT CHANGE UP (ref 0.1–0.3)
LACTATE BLDV-MCNC: 1.8 MMOL/L — SIGNIFICANT CHANGE UP (ref 0.5–2)
LYMPHOCYTES # BLD AUTO: 0.95 K/UL — LOW (ref 1.2–3.4)
LYMPHOCYTES # BLD AUTO: 10.3 % — LOW (ref 20.5–51.1)
MCHC RBC-ENTMCNC: 28.2 PG — SIGNIFICANT CHANGE UP (ref 27–31)
MCHC RBC-ENTMCNC: 32.9 G/DL — SIGNIFICANT CHANGE UP (ref 32–37)
MCV RBC AUTO: 85.6 FL — SIGNIFICANT CHANGE UP (ref 81–99)
MONOCYTES # BLD AUTO: 0.79 K/UL — HIGH (ref 0.1–0.6)
MONOCYTES NFR BLD AUTO: 8.6 % — SIGNIFICANT CHANGE UP (ref 1.7–9.3)
NEUTROPHILS # BLD AUTO: 7.28 K/UL — HIGH (ref 1.4–6.5)
NEUTROPHILS NFR BLD AUTO: 79.1 % — HIGH (ref 42.2–75.2)
NRBC # BLD: 0 /100 WBCS — SIGNIFICANT CHANGE UP (ref 0–0)
PCO2 BLDV: 71 MMHG — CRITICAL HIGH (ref 39–42)
PH BLDV: 7.35 — SIGNIFICANT CHANGE UP (ref 7.32–7.43)
PLATELET # BLD AUTO: 298 K/UL — SIGNIFICANT CHANGE UP (ref 130–400)
PMV BLD: 9.6 FL — SIGNIFICANT CHANGE UP (ref 7.4–10.4)
PO2 BLDV: 67 MMHG — HIGH (ref 25–45)
POTASSIUM BLDV-SCNC: 4.6 MMOL/L — SIGNIFICANT CHANGE UP (ref 3.5–5.1)
POTASSIUM SERPL-MCNC: 4.6 MMOL/L — SIGNIFICANT CHANGE UP (ref 3.5–5)
POTASSIUM SERPL-SCNC: 4.6 MMOL/L — SIGNIFICANT CHANGE UP (ref 3.5–5)
PROT SERPL-MCNC: 6.6 G/DL — SIGNIFICANT CHANGE UP (ref 6–8)
RBC # BLD: 5.71 M/UL — HIGH (ref 4.2–5.4)
RBC # FLD: 14 % — SIGNIFICANT CHANGE UP (ref 11.5–14.5)
RSV RNA NPH QL NAA+NON-PROBE: SIGNIFICANT CHANGE UP
SAO2 % BLDV: 91.9 % — HIGH (ref 67–88)
SARS-COV-2 RNA SPEC QL NAA+PROBE: SIGNIFICANT CHANGE UP
SODIUM SERPL-SCNC: 140 MMOL/L — SIGNIFICANT CHANGE UP (ref 135–146)
WBC # BLD: 9.21 K/UL — SIGNIFICANT CHANGE UP (ref 4.8–10.8)
WBC # FLD AUTO: 9.21 K/UL — SIGNIFICANT CHANGE UP (ref 4.8–10.8)

## 2024-12-29 PROCEDURE — 99221 1ST HOSP IP/OBS SF/LOW 40: CPT

## 2024-12-29 PROCEDURE — 83605 ASSAY OF LACTIC ACID: CPT

## 2024-12-29 PROCEDURE — 82803 BLOOD GASES ANY COMBINATION: CPT

## 2024-12-29 PROCEDURE — 85018 HEMOGLOBIN: CPT

## 2024-12-29 PROCEDURE — 97163 PT EVAL HIGH COMPLEX 45 MIN: CPT | Mod: GP

## 2024-12-29 PROCEDURE — 71045 X-RAY EXAM CHEST 1 VIEW: CPT | Mod: 26

## 2024-12-29 PROCEDURE — 84295 ASSAY OF SERUM SODIUM: CPT

## 2024-12-29 PROCEDURE — 82962 GLUCOSE BLOOD TEST: CPT

## 2024-12-29 PROCEDURE — 85014 HEMATOCRIT: CPT

## 2024-12-29 PROCEDURE — 84132 ASSAY OF SERUM POTASSIUM: CPT

## 2024-12-29 PROCEDURE — 70450 CT HEAD/BRAIN W/O DYE: CPT | Mod: MC

## 2024-12-29 PROCEDURE — 99285 EMERGENCY DEPT VISIT HI MDM: CPT

## 2024-12-29 PROCEDURE — 82330 ASSAY OF CALCIUM: CPT

## 2024-12-29 PROCEDURE — 94640 AIRWAY INHALATION TREATMENT: CPT

## 2024-12-29 RX ORDER — IPRATROPIUM BROMIDE AND ALBUTEROL SULFATE .5; 2.5 MG/3ML; MG/3ML
3 SOLUTION RESPIRATORY (INHALATION) ONCE
Refills: 0 | Status: COMPLETED | OUTPATIENT
Start: 2024-12-29 | End: 2024-12-29

## 2024-12-29 RX ORDER — IPRATROPIUM BROMIDE AND ALBUTEROL SULFATE .5; 2.5 MG/3ML; MG/3ML
3 SOLUTION RESPIRATORY (INHALATION) EVERY 6 HOURS
Refills: 0 | Status: DISCONTINUED | OUTPATIENT
Start: 2024-12-29 | End: 2024-12-31

## 2024-12-29 RX ORDER — METHYLPREDNISOLONE 4 MG/1
125 TABLET ORAL ONCE
Refills: 0 | Status: COMPLETED | OUTPATIENT
Start: 2024-12-29 | End: 2024-12-29

## 2024-12-29 RX ORDER — METHYLPREDNISOLONE 4 MG/1
40 TABLET ORAL EVERY 8 HOURS
Refills: 0 | Status: DISCONTINUED | OUTPATIENT
Start: 2024-12-29 | End: 2024-12-31

## 2024-12-29 RX ADMIN — IPRATROPIUM BROMIDE AND ALBUTEROL SULFATE 3 MILLILITER(S): .5; 2.5 SOLUTION RESPIRATORY (INHALATION) at 16:24

## 2024-12-29 RX ADMIN — IPRATROPIUM BROMIDE AND ALBUTEROL SULFATE 3 MILLILITER(S): .5; 2.5 SOLUTION RESPIRATORY (INHALATION) at 16:23

## 2024-12-29 RX ADMIN — METHYLPREDNISOLONE 125 MILLIGRAM(S): 4 TABLET ORAL at 16:23

## 2024-12-29 RX ADMIN — METHYLPREDNISOLONE 40 MILLIGRAM(S): 4 TABLET ORAL at 22:29

## 2024-12-29 NOTE — H&P ADULT - NSHPPOADEEPVENOUSTHROMB_GEN_A_CORE
[address] [place] [address], [address]   [phone]    Patient is leaving rehab today for home & is leaving aroung 9:30am  [address] [place] [address] is asking for a verbal order to discontinue the lovenox injections but will need a written order as well  Patient has been on this since 9/17/19 & they were administering every 12 hours  [address] [place] [address] is stating that the patient is completely ambulatory  Fax to [phone]  no

## 2024-12-29 NOTE — H&P ADULT - HISTORY OF PRESENT ILLNESS
[age]yo female with history of COPD exacerbation, presents to the ER due to difficulty breathing for last few days and today her son said her lips were blue. Patient notes that she ran out of all her medications and hasn't been able to follow up with a pulmonologist as advised after previous admission. Stopped tobacco use a few days ago

## 2024-12-29 NOTE — ED PROVIDER NOTE - PHYSICAL EXAMINATION
--EXAM--  VITAL SIGNS: I have reviewed vs documented at present.  CONSTITUTIONAL: Well-developed; well-nourished; in no acute distress.   SKIN: Warm and dry, no acute rash.   HEAD: Normocephalic; atraumatic.  EYES: PERRL, EOM intact; conjunctiva and sclera clear. No nystagmus.  ENT: No nasal discharge; airway clear.  NECK: Supple; non tender.  CARD: S1, S2, Regular rate and rhythm.   RESP:scattered wheezing

## 2024-12-29 NOTE — ED ADULT TRIAGE NOTE - CHIEF COMPLAINT QUOTE
Progressive worsening SOB x months. PT states she ran out of medication and has been having trouble breathing. PT uses home O2

## 2024-12-29 NOTE — ED PROVIDER NOTE - CLINICAL SUMMARY MEDICAL DECISION MAKING FREE TEXT BOX
Patient symptoms treated.  Patient still feeling slightly short of breath.  Will require admission at this time.

## 2024-12-29 NOTE — ED PROVIDER NOTE - ATTENDING APP SHARED VISIT CONTRIBUTION OF CARE
69-year-old female past medical history noted including COPD, hypertension, noncompliant with medications of last year.  Patient says she has been out of them presents for evaluation via EMS for increased shortness of breath over the last few days.  Per EMS and patient's son stated that her lips were blue earlier.  No chest pain, on exam patient in NAD, AOx3, lungs with scattered wheezing, abdomen soft nontender, no edema

## 2024-12-29 NOTE — ED ADULT NURSE NOTE - IN THE PAST 12 MONTHS HAVE YOU USED DRUGS OTHER THAN THOSE REQUIRED FOR MEDICAL REASON?
----- Message from Kristel Adams sent at 8/4/2020 10:35 AM CDT -----  Regarding: TCM Appointment Scheduled  Transitional Care Management - Hospital Discharge Notification    Ms. Tg Duggan was discharged from [facility] hospital on 8/3/2020. Ms. Duggan has a Transitional Care Management follow-up appointment already scheduled with  MEI Vazquez 8/10/2020.    Please remember to initiate the Transitional Care Management telephone encounter within 2 business days of discharge.         No

## 2024-12-29 NOTE — H&P ADULT - NSHPLABSRESULTS_GEN_ALL_CORE
CXR to me is COPD changes only (NAPD) CXR to me is COPD changes only (NAPD)                          16.1   9.21  )-----------( 298      ( 29 Dec 2024 16:10 )             48.9     12-29    140  |  95[L]  |  20  ----------------------------<  103[H]  4.6   |  32  |  0.7    Ca    8.8      29 Dec 2024 18:00    TPro  6.6  /  Alb  3.8  /  TBili  0.5  /  DBili  x   /  AST  16  /  ALT  10  /  AlkPhos  80  12-29          Urinalysis Basic - ( 29 Dec 2024 18:00 )    Color: x / Appearance: x / SG: x / pH: x  Gluc: 103 mg/dL / Ketone: x  / Bili: x / Urobili: x   Blood: x / Protein: x / Nitrite: x   Leuk Esterase: x / RBC: x / WBC x   Sq Epi: x / Non Sq Epi: x / Bacteria: x

## 2024-12-29 NOTE — H&P ADULT - ASSESSMENT
COPD exacerbation     Medication management - patient ran out of her medications so she no longer takes any  COPD exacerbation     High BP in the ER    Medication management - patient ran out of her medications so she no longer takes any  COPD exacerbation - for now continue Duoneb and IV steroids with pulmonary consult. Need to resume usual COPD meds upon discharge     High BP in the ER (denies history of hypertension but had episodes of high BP in past) - monitor     Medication management - patient ran out of her medications so she no longer takes any - need to arrange for assistance upon discharge     Old records reviewed

## 2024-12-29 NOTE — ED PROVIDER NOTE - OBJECTIVE STATEMENT
69-year-old female presents to the ED for evaluation of shortness of breath.  Patient is a current smoker and has a history of COPD exacerbation.  Patient states she uses oxygen at home as needed patient states today her son told her that her lips looked blue.  Patient also reports that she has been having difficulty breathing the last couple days.

## 2024-12-30 LAB
BASE EXCESS BLDV CALC-SCNC: 9.3 MMOL/L — HIGH (ref -2–3)
CA-I SERPL-SCNC: 1.06 MMOL/L — LOW (ref 1.15–1.33)
GAS PNL BLDV: 132 MMOL/L — LOW (ref 136–145)
GAS PNL BLDV: SIGNIFICANT CHANGE UP
GAS PNL BLDV: SIGNIFICANT CHANGE UP
GLUCOSE BLDC GLUCOMTR-MCNC: 173 MG/DL — HIGH (ref 70–99)
HCO3 BLDV-SCNC: 36 MMOL/L — HIGH (ref 22–29)
HCT VFR BLDA CALC: 50 % — HIGH (ref 34.5–46.5)
HGB BLD CALC-MCNC: 16.5 G/DL — HIGH (ref 11.7–16.1)
LACTATE BLDV-MCNC: 1.2 MMOL/L — SIGNIFICANT CHANGE UP (ref 0.5–2)
PCO2 BLDV: 56 MMHG — HIGH (ref 39–42)
PH BLDV: 7.42 — SIGNIFICANT CHANGE UP (ref 7.32–7.43)
PO2 BLDV: 52 MMHG — HIGH (ref 25–45)
POTASSIUM BLDV-SCNC: 4.4 MMOL/L — SIGNIFICANT CHANGE UP (ref 3.5–5.1)
SAO2 % BLDV: 85.4 % — SIGNIFICANT CHANGE UP (ref 67–88)

## 2024-12-30 PROCEDURE — 70450 CT HEAD/BRAIN W/O DYE: CPT | Mod: 26

## 2024-12-30 PROCEDURE — 90792 PSYCH DIAG EVAL W/MED SRVCS: CPT | Mod: 95

## 2024-12-30 PROCEDURE — 99223 1ST HOSP IP/OBS HIGH 75: CPT

## 2024-12-30 PROCEDURE — 99233 SBSQ HOSP IP/OBS HIGH 50: CPT

## 2024-12-30 RX ORDER — POLYETHYLENE GLYCOL 3350 17 G/DOSE
17 POWDER (GRAM) ORAL DAILY
Refills: 0 | Status: DISCONTINUED | OUTPATIENT
Start: 2024-12-30 | End: 2024-12-31

## 2024-12-30 RX ORDER — MIRTAZAPINE 7.5 MG/1
7.5 TABLET, FILM COATED ORAL AT BEDTIME
Refills: 0 | Status: DISCONTINUED | OUTPATIENT
Start: 2024-12-30 | End: 2024-12-31

## 2024-12-30 RX ADMIN — METHYLPREDNISOLONE 40 MILLIGRAM(S): 4 TABLET ORAL at 06:21

## 2024-12-30 RX ADMIN — Medication 17 GRAM(S): at 10:19

## 2024-12-30 RX ADMIN — METHYLPREDNISOLONE 40 MILLIGRAM(S): 4 TABLET ORAL at 21:15

## 2024-12-30 RX ADMIN — METHYLPREDNISOLONE 40 MILLIGRAM(S): 4 TABLET ORAL at 13:14

## 2024-12-30 RX ADMIN — IPRATROPIUM BROMIDE AND ALBUTEROL SULFATE 3 MILLILITER(S): .5; 2.5 SOLUTION RESPIRATORY (INHALATION) at 19:25

## 2024-12-30 RX ADMIN — IPRATROPIUM BROMIDE AND ALBUTEROL SULFATE 3 MILLILITER(S): .5; 2.5 SOLUTION RESPIRATORY (INHALATION) at 14:08

## 2024-12-30 RX ADMIN — IPRATROPIUM BROMIDE AND ALBUTEROL SULFATE 3 MILLILITER(S): .5; 2.5 SOLUTION RESPIRATORY (INHALATION) at 09:56

## 2024-12-30 NOTE — RAPID RESPONSE TEAM SUMMARY - NSSITUATIONBACKGROUNDRRT_GEN_ALL_CORE
patient confused and mumbling when woken this AM by RN. upon exam NIHSS 0 and patient answering questions appropriately.  patient confused and mumbling when woken this AM by RN. upon exam NIHSS 0 and patient answering questions appropriately. Patient returned to baseline mental status.

## 2024-12-30 NOTE — BH CONSULTATION LIAISON ASSESSMENT NOTE - HPI (INCLUDE ILLNESS QUALITY, SEVERITY, DURATION, TIMING, CONTEXT, MODIFYING FACTORS, ASSOCIATED SIGNS AND SYMPTOMS)
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     No PSYCKES found.     On assessment, patient is AOx4, at times tangential in replies, and with 2/3 word recall. Nasal canula currently in place. Patient states that she has been feeling more depressed over the past year "but I'm a fighter." Patient states that her son is taking a break from school and currently with her. States that she just came back from [place] over the holiday and family got the flu, and then she got the flu as well. 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 Patient does endorse feeling depressed and anhedonic. States that she has been having difficulty going to appointments bc of "fear of what they'll find." She endorses moments of guilt and ruminations as well. States that she stopped smoking this past [place]. She reports that she has been having difficulty finding an outpatient psychiatrist "you can't get anybody in three months" and does not have a therapist. She reports having moments of passive SI but denies every having any active SI/intent/plan. She elaborates that her children are a protective factor. She states "I wouldn't do anything because of the kids." Patient elaborates that she has been having a tough year because of loss of many family members and her dog but would like help. She denies any auditory hallucinations. Reports visual hallucinations of "black things" while in the hospital and states that her eyes were dry. She denies any paranoia, thoughts of harming others, homicidal ideation. She denies any history of psych hospitalizations, self harm , suicide attempts, or violence. She provides verbal consent for son to be contacted. She asks for more nicotine replacement. She is open to an outpatient psych mental health care.    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     No PSYCKES found.     On assessment, patient is AOx4, at times tangential in replies, and with 2/3 word recall. Nasal canula currently in place. Patient states that she has been feeling more depressed over the past year "but I'm a fighter." Patient states that her son is taking a break from school and currently with her. States that she just came back from [place] over the holiday and family got the flu, and then she got the flu as well. 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 Patient does endorse feeling depressed and anhedonic. States that she has been having difficulty going to appointments bc of "fear of what they'll find." She endorses moments of guilt and ruminations as well. States she is a single mom who raise 2 sons. States that she stopped smoking this past [place]. She reports that she has been having difficulty finding an outpatient psychiatrist "you can't get anybody in three months" and does not have a therapist. She reports having moments of passive SI but denies every having any active SI/intent/plan. She elaborates that her children are a protective factor. She states "I wouldn't do anything because of the kids." Patient elaborates that she has been having a tough year because of loss of many family members and her dog but would like help. She denies any auditory hallucinations. Reports visual hallucinations of "black things" while in the hospital and states that her eyes were dry. She denies any paranoia, thoughts of harming others, homicidal ideation. She denies any history of psych hospitalizations, self harm , suicide attempts, or violence. She provides verbal consent for son to be contacted. She asks for more nicotine replacement. She is open to an outpatient psych mental health care.    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     No PSYCKES found.     On assessment, patient is AOx4, at times tangential in replies, and with 2/3 word recall. Nasal canula currently in place. Patient states that she has been feeling more depressed over the past year "but I'm a fighter." Patient states that her son is taking a break from school and currently with her. States that she just came back from [place] over the holiday and family got the flu, and then she got the flu as well. 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 Patient does endorse feeling depressed and anhedonic. States that she has been having difficulty going to appointments bc of "fear of what they'll find." She endorses moments of guilt and ruminations as well. States she is a single mom who raise 2 sons. States that she stopped smoking this past [place]. She reports that she has been having difficulty finding an outpatient psychiatrist "you can't get anybody in three months" and does not have a therapist. She reports having moments of passive SI. Denies ever having any active SI/intent/plan. She elaborates that her children are a protective factor. She states "I wouldn't do anything because of the kids." Patient elaborates that she has been having a tough year because of loss of many family members and her dog but would like help. She denies any auditory hallucinations. Reports visual hallucinations of "black things" while in the hospital and states that her eyes were dry. She denies any paranoia, thoughts of harming others, homicidal ideation. She denies any history of psych hospitalizations, self harm , suicide attempts, or violence. She provides verbal consent for son to be contacted. She asks for more nicotine replacement. She is open to an outpatient psych mental health care.    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     No PSYCKES found.     On assessment, patient is AOx4, at times tangential in replies, and with 2/3 word recall. Nasal canula currently in place. Patient states that she has been feeling more depressed over the past year "but I'm a fighter." Patient states that her son is taking a break from school and currently with her. States that she just came back from [place] over the holiday and family got the flu, and then she got the flu as well. 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 Patient does endorse feeling depressed and anhedonic. States that she has been having difficulty going to appointments bc of "fear of what they'll find." She endorses moments of guilt and ruminations as well. States she is a single mom who raise 2 sons. States that she stopped smoking this past [place]. She reports that she has been having difficulty finding an outpatient psychiatrist "you can't get anybody in three months" and does not have a therapist. She reports having moments of passive SI. Denies ever having any active SI/intent/plan. She elaborates that her children are a protective factor. She states "I wouldn't do anything because of the kids." Patient elaborates that she has been having a tough year because of loss of many family members and her dog but would like help. She denies any auditory hallucinations. Reports visual hallucinations of "black things" while in the hospital and states that her eyes were dry. She denies any paranoia, thoughts of harming others, homicidal ideation. She denies any history of psych hospitalizations, self harm , suicide attempts, or violence. She provides verbal consent for son to be contacted. She asks for more nicotine replacement. I discuss with her risks/benefits of mirtazapine (Remeron) and patient states she is open to this. She is open to an outpatient psych mental health care.

## 2024-12-30 NOTE — PHYSICAL THERAPY INITIAL EVALUATION ADULT - NSACTIVITYREC_GEN_A_PT
Patient Independent W/  Bed mobility , supervision W/ transfer , ambulation and   Stair Nego W/o  AD , Skill PT not recommended at this time , Reconsult in future as appropriated, AM-PAC Mobility Score :21

## 2024-12-30 NOTE — PHYSICAL THERAPY INITIAL EVALUATION ADULT - GENERAL OBSERVATIONS, REHAB EVAL
14:15 -15:00 Chart reviewed. Order received.  Patient is ok to be  seen for PT,confirmed with RN. pt encountered  Semi russell in bed denies pain, and agrees to participate in session, +  Heplock , + O23 LPM via NC,NAD.

## 2024-12-30 NOTE — BH CONSULTATION LIAISON ASSESSMENT NOTE - RISK ASSESSMENT
RF:  PF: RF: medical issues, recent losses, medical stressors, current anxiety and depression, report of past passive SI  PF: no current SI/intent/plan, calm and cooperative on interview, supportive son, no known psych history, currently future oriented and wants to engage in care, does not present actively manic/psychotic

## 2024-12-30 NOTE — PHYSICAL THERAPY INITIAL EVALUATION ADULT - PERTINENT HX OF CURRENT PROBLEM, REHAB EVAL
70yo female with history of COPD exacerbation, presents to the ER due to difficulty breathing for last few days and today her son said her lips were blue. Patient notes that she ran out of all her medications and hasn't been able to follow up with a pulmonologist as advised after previous admission. Stopped tobacco use a few days ago

## 2024-12-30 NOTE — PHYSICAL THERAPY INITIAL EVALUATION ADULT - SITTING BALANCE: DYNAMIC
Okay.  At her recent appointment she stated she was going to start following physician recommendations.  All we can do is encourage her.  Thank you for the update  
Rach called to let Dr TRAVIS know that patient is refusing to go to any appt or take medications, and doesn't care about her health. Dr TRAVIS asked to be made aware if this was the case. Rach would like to discuss next steps.  
good balance

## 2024-12-30 NOTE — PHYSICAL THERAPY INITIAL EVALUATION ADULT - PHYSICAL ASSIST/NONPHYSICAL ASSIST: STAND/SIT, REHAB EVAL
70yo female with history of COPD exacerbation, presents to the ER due to difficulty breathing for last few days and today her son said her lips were blue. Patient notes that she ran out of all her medications and hasn't been able to follow up with a pulmonologist as advised after previous admission. Stopped tobacco use a few days ago/verbal cues

## 2024-12-30 NOTE — BH CONSULTATION LIAISON ASSESSMENT NOTE - CURRENT MEDICATION
MEDICATIONS  (STANDING):  albuterol/ipratropium for Nebulization 3 milliLiter(s) Nebulizer every 6 hours  methylPREDNISolone sodium succinate Injectable 40 milliGRAM(s) IV Push every 8 hours    MEDICATIONS  (PRN):  polyethylene glycol 3350 17 Gram(s) Oral daily PRN Constipation

## 2024-12-30 NOTE — PHYSICAL THERAPY INITIAL EVALUATION ADULT - ADDITIONAL COMMENTS
As per pt  She lives in town house W/ 3 Steps to enter W/  BL HR and 15 Steps to Shower W./ BL HR, as per pt she was independent W/ all functional activity PTA W/ AD and  was driving.

## 2024-12-30 NOTE — BH CONSULTATION LIAISON ASSESSMENT NOTE - SUMMARY
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     On assessment, patient presents with symptoms of anxiety and depression. She endorsed past passive SI thoughts but never any active SI/plan/intent and there is no known history. Patient adamantly states that her children are a protective factor. Would also r/o delirium given tangential speech, difficulty with short term recall, medical issues. Currently does not present acutely manic, psychotic, or with any psych safety concern that meets criteria for involuntary psych admission.     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     On assessment, patient presents with symptoms of anxiety and depression. She endorsed past passive SI thoughts but never any active SI/plan/intent and there is no known history. Patient adamantly states that her children are a protective factor. Would also r/o delirium given tangential speech, difficulty with short term recall, medical issues. Currently does not present acutely manic, psychotic, or with any psych safety concern that meets criteria for involuntary psych admission.     Recommendations:  -Start mirtazapine (Remeron) 7.5mg qHS, monitor for side effects, patient amenable on this interview  -SW attempted to reach son, no reply, will try again later  -Currently does not meet criteria for involuntary psych admission  -Psych will continue to follow for med management  -Needs outpatient mental health follow up prior to discharge   -Delirium precautions   -SW, PT/OT for safe dispo planning if concern for any confusion

## 2024-12-30 NOTE — BH CONSULTATION LIAISON ASSESSMENT NOTE - CONSULT REQUEST TIME
30-Dec-2024 11:03 Cyclosporine Counseling:  I discussed with the patient the risks of cyclosporine including but not limited to hypertension, gingival hyperplasia,myelosuppression, immunosuppression, liver damage, kidney damage, neurotoxicity, lymphoma, and serious infections. The patient understands that monitoring is required including baseline blood pressure, CBC, CMP, lipid panel and uric acid, and then 1-2 times monthly CMP and blood pressure.

## 2024-12-30 NOTE — PATIENT PROFILE ADULT - FALL HARM RISK - HARM RISK INTERVENTIONS

## 2024-12-30 NOTE — CONSULT NOTE ADULT - SUBJECTIVE AND OBJECTIVE BOX
Patient is a 69y old  Female who presents with a chief complaint of     HPI:  70yo female with history of COPD exacerbation, presents to the ER due to difficulty breathing for last few days and today her son said her lips were blue. Patient notes that she ran out of all her medications and hasn't been able to follow up with a pulmonologist as advised after previous admission. Stopped tobacco use a few days ago   (29 Dec 2024 20:37)      PAST MEDICAL & SURGICAL HISTORY:  Smoker      S/P bunionectomy          SOCIAL HX:   Smoking                         ETOH                            Other    FAMILY HISTORY:  .  No cardiovascular or pulmonary family history     REVIEW OF SYSTEMS:    All ROS are negative exept per HPI       Allergies    No Known Allergies    Intolerances          PHYSICAL EXAM  Vital Signs Last 24 Hrs  T(C): 36.9 (30 Dec 2024 08:11), Max: 37 (29 Dec 2024 15:33)  T(F): 98.5 (30 Dec 2024 08:11), Max: 98.6 (29 Dec 2024 15:33)  HR: 90 (30 Dec 2024 08:11) (90 - 112)  BP: 126/60 (30 Dec 2024 08:11) (126/60 - 215/95)  BP(mean): --  RR: 20 (29 Dec 2024 19:09) (20 - 24)  SpO2: 95% (30 Dec 2024 08:11) (91% - 98%)    Parameters below as of 30 Dec 2024 08:11  Patient On (Oxygen Delivery Method): nasal cannula  O2 Flow (L/min): 3    Constitutional: no acute distress, well nourished well developed  Neuro: moving all 4 limbs spontaneously, no facial droop or dysarthria  HEENT: NCAT, anicteric  Neck: no visible lymphadenopathy or goiter  Pulm: no respiratory distress. clear to auscultation bilaterally  Cardiac: extremities appear pink and well-perfused.  regular rhythm and rate, no murmur detected  Abdomen: non-distended  Extremities: no peripheral edema  Skin: no visible rashes          LABS:                          16.1   9.21  )-----------( 298      ( 29 Dec 2024 16:10 )             48.9                                               12-29    140  |  95[L]  |  20  ----------------------------<  103[H]  4.6   |  32  |  0.7    Ca    8.8      29 Dec 2024 18:00    TPro  6.6  /  Alb  3.8  /  TBili  0.5  /  DBili  x   /  AST  16  /  ALT  10  /  AlkPhos  80  12-29                                             Urinalysis Basic - ( 29 Dec 2024 18:00 )    Color: x / Appearance: x / SG: x / pH: x  Gluc: 103 mg/dL / Ketone: x  / Bili: x / Urobili: x   Blood: x / Protein: x / Nitrite: x   Leuk Esterase: x / RBC: x / WBC x   Sq Epi: x / Non Sq Epi: x / Bacteria: x                                                  LIVER FUNCTIONS - ( 29 Dec 2024 18:00 )  Alb: 3.8 g/dL / Pro: 6.6 g/dL / ALK PHOS: 80 U/L / ALT: 10 U/L / AST: 16 U/L / GGT: x                                                                                                MEDICATIONS  (STANDING):  albuterol/ipratropium for Nebulization 3 milliLiter(s) Nebulizer every 6 hours  methylPREDNISolone sodium succinate Injectable 40 milliGRAM(s) IV Push every 8 hours  mirtazapine 7.5 milliGRAM(s) Oral at bedtime    MEDICATIONS  (PRN):  polyethylene glycol 3350 17 Gram(s) Oral daily PRN Constipation      X-Rays reviewed:    CXR interpreted by me:  Chest x-ray performed 12/29/2024 images and radiologist read reviewed, demonstrate mild hyperinflation, mild peribronchial thickening.  Otherwise no acute intrathoracic pathology

## 2024-12-30 NOTE — PROGRESS NOTE ADULT - SUBJECTIVE AND OBJECTIVE BOX
FRANK RAMOS 69y Female  MRN#: [id number]   Hospital Day: 1d    SUBJECTIVE  Patient is a 69y old Female who presents with a chief complaint of Currently admitted to medicine with the primary diagnosis of COPD exacerbation      INTERVAL HPI AND OVERNIGHT EVENTS:  Patient was examined and seen at bedside. This morning she is resting comfortably in bed and reports no issues or overnight events.  sob improved, intermittent cough, denies chest pain , n/v/d/c, hematuria or bloody stool.     endorsed feeling depressed, psych consulted, patient denies SI/HI, psych recommended Remeron 7.5 mg and outpatient follow up.     REVIEW OF SYMPTOMS:  10 point ROS negative except as above.    OBJECTIVE  PAST MEDICAL & SURGICAL HISTORY  Smoker    S/P bunionectomy      ALLERGIES:  No Known Allergies    MEDICATIONS:  STANDING MEDICATIONS  albuterol/ipratropium for Nebulization 3 milliLiter(s) Nebulizer every 6 hours  methylPREDNISolone sodium succinate Injectable 40 milliGRAM(s) IV Push every 8 hours  mirtazapine 7.5 milliGRAM(s) Oral at bedtime    PRN MEDICATIONS  polyethylene glycol 3350 17 Gram(s) Oral daily PRN      VITAL SIGNS: Last 24 Hours  T(C): 36.9 (30 Dec 2024 08:11), Max: 37 (29 Dec 2024 15:33)  T(F): 98.5 (30 Dec 2024 08:11), Max: 98.6 (29 Dec 2024 15:33)  HR: 90 (30 Dec 2024 08:11) (90 - 112)  BP: 126/60 (30 Dec 2024 08:11) (126/60 - 215/95)  BP(mean): --  RR: 20 (29 Dec 2024 19:09) (20 - 24)  SpO2: 95% (30 Dec 2024 08:11) (91% - 98%)    PHYSICAL EXAM:  GENERAL: NAD, well-groomed,   HEENT - NC/AT,   NECK: Supple, No JVD  CHEST/LUNG: b/l air entry  No rales,  HEART: Regular rate and rhythm; No murmurs,   ABDOMEN: Soft, Nontender, Nondistended; Bowel sounds present  EXTREMITIES: no edema  NEURO:  No Focal deficits, sensory and motor intact  SKIN: No rashes or lesions    LABS:                        16.1   9.21  )-----------( 298      ( 29 Dec 2024 16:10 )             48.9     12-29    140  |  95[L]  |  20  ----------------------------<  103[H]  4.6   |  32  |  0.7    Ca    8.8      29 Dec 2024 18:00    TPro  6.6  /  Alb  3.8  /  TBili  0.5  /  DBili  x   /  AST  16  /  ALT  10  /  AlkPhos  80  12-29      Urinalysis Basic - ( 29 Dec 2024 18:00 )    Color: x / Appearance: x / SG: x / pH: x  Gluc: 103 mg/dL / Ketone: x  / Bili: x / Urobili: x   Blood: x / Protein: x / Nitrite: x   Leuk Esterase: x / RBC: x / WBC x   Sq Epi: x / Non Sq Epi: x / Bacteria: x      RADIOLOGY:  < from: CT Head No Cont (12.30.24 @ 07:28) >  IMPRESSION:    No evidence of acute intracranial pathology.    < end of copied text >  < from: Xray Chest 1 View-PORTABLE IMMEDIATE (Xray Chest 1 View-PORTABLE IMMEDIATE .) (12.29.24 @ 16:42) >    IMPRESSION:    No radiographic evidence of acute cardiopulmonary disease.   Redemonstration peribronchial thickening    < end of copied text >

## 2024-12-30 NOTE — PROGRESS NOTE ADULT - ASSESSMENT
A 69 yo female with long term smoking, COPD,     Shortness of breath  COPD exacerbation   Active Tobacco smoking   -c/w Steroids Duo nebs and inhalers   -Counselled on Tobacco cessation c/w Nicotine patch  -Pulmonary consult appreciated: prednisone, prednisone taper  -ID consult appreciated: c/w azithromycin   -Goal to taper off supplemental oxygen     mild depression / anxiety   - psych following  - started Remeron    DVT PPX: Lovenox   GI PPX: PPI   Full Code   Dispo: from Home     Pending:  - Clinical improvement , Tapering off Supplemental oxygen , likely dc tomorrow

## 2024-12-30 NOTE — CONSULT NOTE ADULT - ASSESSMENT
FRANK RAMOS is a 69y woman with a medical history significant for active tobacco abuse and COPD who presented initially with shortness of breath, now admitted with aeCOPD.  Pulmonary has been consulted for further management.      IMPRESSION:        RECOMMENDATIONS:        ********************************* INCOMPLETE NOTE ********************************   FRANK RAMOS is a 69y woman with a medical history significant for active tobacco abuse and COPD who presented initially with shortness of breath, now admitted with aeCOPD.  Pulmonary has been consulted for further management.      IMPRESSION:    Acute chronic COPD with exacerbation  -- no pneumonia     Acute on chronic hypercapnic respiratory failure  Tobacco abuse  Erythrocytosis, likely secondary  several tiny pulm nodules      RECOMMENDATIONS:    -Expanded RVP  -Continue standing DuoNebs and albuterol as needed  -Complete minimum of 5-day course of 40 mg of prednisone (or IV equivalent)  --OK to extend steroid course if patient's wheezing does not butch within 5 days  -Advair BID  -Tobacco cessation counseling  -Ambulatory oximetry prior to discharge, may require home O2  -Outpatient follow-up with pulmonary      ********************************* INCOMPLETE NOTE ********************************   FRANK RAMOS is a 69y woman with a medical history significant for active tobacco abuse and COPD who presented initially with shortness of breath, now admitted with aeCOPD.  Pulmonary has been consulted for further management.      IMPRESSION:    Acute chronic COPD with exacerbation  -- no pneumonia     Acute on chronic hypercapnic respiratory failure  Tobacco abuse  Erythrocytosis, likely secondary  several tiny pulm nodules      RECOMMENDATIONS:    -Expanded RVP  -Continue standing DuoNebs and albuterol as needed  -Complete minimum of 5-day course of 40 mg of prednisone (or IV equivalent)  --OK to extend steroid course if patient's wheezing does not butch within 5 days  -Advair BID  -Discharge on triple inhaled therapy such as Trelegy 200  -Tobacco cessation counseling  -Ambulatory oximetry prior to discharge, will likely require home O2  -Outpatient follow-up with pulmonary for further management and PFT  -Patient would benefit from pulmonary rehab

## 2024-12-30 NOTE — BH CONSULTATION LIAISON ASSESSMENT NOTE - NSBHCHARTREVIEWVS_PSY_A_CORE FT
Vital Signs Last 24 Hrs  T(C): 36.9 (30 Dec 2024 08:11), Max: 37 (29 Dec 2024 15:33)  T(F): 98.5 (30 Dec 2024 08:11), Max: 98.6 (29 Dec 2024 15:33)  HR: 90 (30 Dec 2024 08:11) (90 - 112)  BP: 126/60 (30 Dec 2024 08:11) (126/60 - 215/95)  BP(mean): --  RR: 20 (29 Dec 2024 19:09) (20 - 24)  SpO2: 95% (30 Dec 2024 08:11) (91% - 98%)    Parameters below as of 30 Dec 2024 08:11  Patient On (Oxygen Delivery Method): nasal cannula  O2 Flow (L/min): 3

## 2024-12-30 NOTE — BH CONSULTATION LIAISON ASSESSMENT NOTE - NSBHMSEHYG_PSY_A_CORE
Med: Glipizide     Last refill: 12/5/22  Last OV: 1/19/23  Upcoming: Nothing scheduled    Preferred pharmacy has been set up and verified.   Fair

## 2024-12-31 ENCOUNTER — TRANSCRIPTION ENCOUNTER (OUTPATIENT)
Age: 69
End: 2024-12-31

## 2024-12-31 VITALS
HEART RATE: 91 BPM | RESPIRATION RATE: 16 BRPM | SYSTOLIC BLOOD PRESSURE: 137 MMHG | DIASTOLIC BLOOD PRESSURE: 73 MMHG | OXYGEN SATURATION: 92 % | TEMPERATURE: 99 F

## 2024-12-31 PROCEDURE — 99239 HOSP IP/OBS DSCHRG MGMT >30: CPT

## 2024-12-31 PROCEDURE — 99233 SBSQ HOSP IP/OBS HIGH 50: CPT | Mod: 95

## 2024-12-31 RX ORDER — MIRTAZAPINE 7.5 MG/1
1 TABLET, FILM COATED ORAL
Qty: 30 | Refills: 0
Start: 2024-12-31 | End: 2025-01-29

## 2024-12-31 RX ORDER — PREDNISOLONE 15 MG/5 ML
1 SOLUTION, ORAL ORAL
Qty: 30 | Refills: 0
Start: 2024-12-31 | End: 2025-01-11

## 2024-12-31 RX ORDER — NICOTINE POLACRILEX 4 MG/1
2 LOZENGE ORAL DAILY
Refills: 0 | Status: DISCONTINUED | OUTPATIENT
Start: 2024-12-31 | End: 2024-12-31

## 2024-12-31 RX ORDER — ALBUTEROL SULFATE 90 UG/1
3 INHALANT RESPIRATORY (INHALATION)
Qty: 36 | Refills: 0
Start: 2024-12-31 | End: 2025-01-29

## 2024-12-31 RX ORDER — FLUTICASONE FUROATE, UMECLIDINIUM BROMIDE AND VILANTEROL TRIFENATATE 100; 62.5; 25 UG/1; UG/1; UG/1
1 POWDER RESPIRATORY (INHALATION)
Qty: 1 | Refills: 0
Start: 2024-12-31 | End: 2025-01-29

## 2024-12-31 RX ORDER — ALBUTEROL SULFATE 90 UG/1
2 INHALANT RESPIRATORY (INHALATION)
Qty: 1 | Refills: 0
Start: 2024-12-31 | End: 2025-01-29

## 2024-12-31 RX ORDER — NICOTINE POLACRILEX 4 MG/1
2 LOZENGE ORAL THREE TIMES A DAY
Refills: 0 | Status: DISCONTINUED | OUTPATIENT
Start: 2024-12-31 | End: 2024-12-31

## 2024-12-31 RX ORDER — PANTOPRAZOLE 40 MG/1
1 TABLET, DELAYED RELEASE ORAL
Qty: 30 | Refills: 0
Start: 2024-12-31 | End: 2025-01-29

## 2024-12-31 RX ORDER — PREDNISONE 5 MG
40 TABLET ORAL DAILY
Refills: 0 | Status: DISCONTINUED | OUTPATIENT
Start: 2024-12-31 | End: 2024-12-31

## 2024-12-31 RX ADMIN — Medication 17 GRAM(S): at 05:16

## 2024-12-31 RX ADMIN — NICOTINE POLACRILEX 2 MILLIGRAM(S): 4 LOZENGE ORAL at 13:06

## 2024-12-31 RX ADMIN — Medication 40 MILLIGRAM(S): at 08:03

## 2024-12-31 RX ADMIN — IPRATROPIUM BROMIDE AND ALBUTEROL SULFATE 3 MILLILITER(S): .5; 2.5 SOLUTION RESPIRATORY (INHALATION) at 15:14

## 2024-12-31 RX ADMIN — IPRATROPIUM BROMIDE AND ALBUTEROL SULFATE 3 MILLILITER(S): .5; 2.5 SOLUTION RESPIRATORY (INHALATION) at 09:15

## 2024-12-31 NOTE — BH CONSULTATION LIAISON PROGRESS NOTE - NSBHASSESSMENTFT_PSY_ALL_CORE
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    On assessment, patient presents with symptoms of anxiety and depression. She endorsed past passive SI thoughts but never any active SI/plan/intent and there is no known history. Patient adamantly states that her children are a protective factor. Would also r/o delirium given tangential speech, difficulty with short term recall, medical issues. Currently does not present acutely manic, psychotic, or with any psych safety concern that meets criteria for involuntary psych admission.    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    On assessment, patient presents with symptoms of anxiety and depression. She endorsed past passive SI thoughts but never any active SI/plan/intent and there is no known history. Patient adamantly states that her children are a protective factor. Would also r/o delirium given tangential speech, difficulty with short term recall, medical issues. Currently does not present acutely manic, psychotic, or with any psych safety concern that meets criteria for involuntary psych admission.    12/31- patient presented more linear today and forthcoming. endorses feeling anxious and depressed. would like to try talk therapy first rather than medications. would like outpatient psych appointment as well.       Recommendations:  -Start mirtazapine (Remeron) 7.5mg qHS-- patient declined medications today  -Patient provided other number for son, SW attempted and left vm  -Currently does not meet criteria for involuntary psych admission  -SW to make outpatient psych appointment at [facility]   -Delirium precautions, SW, PT/OT for dispo planning

## 2024-12-31 NOTE — DISCHARGE NOTE NURSING/CASE MANAGEMENT/SOCIAL WORK - FINANCIAL ASSISTANCE
[facility] provides services at a reduced cost to those who are determined to be eligible through [facility]’s financial assistance program. Information regarding [facility]’s financial assistance program can be found by going to [email or web address] or by calling [phone].

## 2024-12-31 NOTE — BH CONSULTATION LIAISON PROGRESS NOTE - NSBHCHARTREVIEWVS_PSY_A_CORE FT
Vital Signs Last 24 Hrs  T(C): 36.7 (31 Dec 2024 05:21), Max: 36.8 (30 Dec 2024 14:59)  T(F): 98 (31 Dec 2024 05:21), Max: 98.2 (30 Dec 2024 14:59)  HR: 91 (31 Dec 2024 05:21) (90 - 890)  BP: 154/77 (31 Dec 2024 05:21) (129/67 - 154/77)  BP(mean): --  RR: 16 (31 Dec 2024 05:21) (16 - 18)  SpO2: 97% (31 Dec 2024 09:29) (91% - 97%)    Parameters below as of 31 Dec 2024 09:29  Patient On (Oxygen Delivery Method): nasal cannula  O2 Flow (L/min): 2

## 2024-12-31 NOTE — DISCHARGE NOTE PROVIDER - NSDCMRMEDTOKEN_GEN_ALL_CORE_FT
albuterol 90 mcg/inh inhalation aerosol: 2 puff(s) inhaled every 8 hours as needed for Shortness of Breath and/or Wheezing  ascorbic acid 500 mg oral tablet: 1 tab(s) orally 2 times a day  azithromycin 500 mg oral tablet: 1 tab(s) orally once a day Take n 1/6  benzonatate 100 mg oral capsule: 1 cap(s) orally every 8 hours  budesonide-formoterol 160 mcg-4.5 mcg/inh inhalation aerosol: inhaled 2 times a day  guaiFENesin-dextromethorphan 100 mg-10 mg/5 mL oral liquid: 10 milliliter(s) orally every 6 hours  lactobacillus acidophilus oral capsule: 1 tab(s) orally 3 times a day  nicotine 21 mg/24 hr transdermal film, extended release: 1 film(s) transdermal once a day  oseltamivir 75 mg oral capsule: 1 cap(s) orally 2 times a day Take only one pill tonight then complete dose as precribed  pantoprazole 40 mg oral delayed release tablet: 1 tab(s) orally once a day (before a meal)  predniSONE 20 mg oral tablet: 2 tab(s) orally once a day  senna leaf extract oral tablet: 2 tab(s) orally once a day (at bedtime) as needed for Constipation  zinc sulfate 220 mg oral capsule: 1 cap(s) orally once a day   Albuterol (Eqv-Proventil HFA) 90 mcg/inh inhalation aerosol: 2 inhaled every 6 hours as needed for  bronchospasm  albuterol 2.5 mg/3 mL (0.083%) inhalation solution: 3 milliliter(s) by nebulizer every 6 hours as needed for  bronchospasm  albuterol 90 mcg/inh inhalation aerosol: 2 puff(s) inhaled every 8 hours as needed for Shortness of Breath and/or Wheezing  ascorbic acid 500 mg oral tablet: 1 tab(s) orally 2 times a day  azithromycin 500 mg oral tablet: 1 tab(s) orally once a day Take n   benzonatate 100 mg oral capsule: 1 cap(s) orally every 8 hours  budesonide-formoterol 160 mcg-4.5 mcg/inh inhalation aerosol: inhaled 2 times a day  guaiFENesin-dextromethorphan 100 mg-10 mg/5 mL oral liquid: 10 milliliter(s) orally every 6 hours  lactobacillus acidophilus oral capsule: 1 tab(s) orally 3 times a day  nicotine 21 mg/24 hr transdermal film, extended release: 1 film(s) transdermal once a day  oseltamivir 75 mg oral capsule: 1 cap(s) orally 2 times a day Take only one pill tonight then complete dose as precribed  pantoprazole 40 mg oral delayed release tablet: 1 tab(s) orally once a day (before a meal)  prednisoLONE 10 mg oral tablet, disintegratin tab(s) orally once a day 40 mg PO x 3 days, then 30 mg PO x 3 days, then 20 mg PO x 3 days , then 10 mg PO x 3 days  predniSONE 20 mg oral tablet: 2 tab(s) orally once a day  senna leaf extract oral tablet: 2 tab(s) orally once a day (at bedtime) as needed for Constipation  zinc sulfate 220 mg oral capsule: 1 cap(s) orally once a day   Albuterol (Eqv-Proventil HFA) 90 mcg/inh inhalation aerosol: 2 inhaled every 6 hours as needed for  bronchospasm  albuterol 2.5 mg/3 mL (0.083%) inhalation solution: 3 milliliter(s) by nebulizer every 6 hours as needed for  bronchospasm  albuterol 90 mcg/inh inhalation aerosol: 2 puff(s) inhaled every 8 hours as needed for Shortness of Breath and/or Wheezing  ascorbic acid 500 mg oral tablet: 1 tab(s) orally 2 times a day  azithromycin 500 mg oral tablet: 1 tab(s) orally once a day Take n   benzonatate 100 mg oral capsule: 1 cap(s) orally every 8 hours  budesonide-formoterol 160 mcg-4.5 mcg/inh inhalation aerosol: inhaled 2 times a day  guaiFENesin-dextromethorphan 100 mg-10 mg/5 mL oral liquid: 10 milliliter(s) orally every 6 hours  lactobacillus acidophilus oral capsule: 1 tab(s) orally 3 times a day  Nebulizer machine: 1 unit  nicotine 21 mg/24 hr transdermal film, extended release: 1 film(s) transdermal once a day  oseltamivir 75 mg oral capsule: 1 cap(s) orally 2 times a day Take only one pill tonight then complete dose as precribed  pantoprazole 40 mg oral delayed release tablet: 1 tab(s) orally once a day (before a meal)  prednisoLONE 10 mg oral tablet, disintegratin tab(s) orally once a day 40 mg PO x 3 days, then 30 mg PO x 3 days, then 20 mg PO x 3 days , then 10 mg PO x 3 days  predniSONE 20 mg oral tablet: 2 tab(s) orally once a day  senna leaf extract oral tablet: 2 tab(s) orally once a day (at bedtime) as needed for Constipation  Trelegy Ellipta 200 mcg-62.5 mcg-25 mcg/inh inhalation powder: 1 inhaled once a day  zinc sulfate 220 mg oral capsule: 1 cap(s) orally once a day   Albuterol (Eqv-Proventil HFA) 90 mcg/inh inhalation aerosol: 2 inhaled every 6 hours as needed for  bronchospasm  albuterol 2.5 mg/3 mL (0.083%) inhalation solution: 3 milliliter(s) by nebulizer every 6 hours as needed for  bronchospasm  mirtazapine 7.5 mg oral tablet: 1 tab(s) orally once a day (at bedtime)  Nebulizer machine: 1 unit  pantoprazole 40 mg oral delayed release tablet: 1 tab(s) orally once a day (before a meal)  prednisoLONE 10 mg oral tablet, disintegratin tab(s) orally once a day 40 mg PO x 3 days, then 30 mg PO x 3 days, then 20 mg PO x 3 days , then 10 mg PO x 3 days  Trelegy Ellipta 200 mcg-62.5 mcg-25 mcg/inh inhalation powder: 1 inhaled once a day

## 2024-12-31 NOTE — DISCHARGE NOTE PROVIDER - HOSPITAL COURSE
A 67 yo female with long term smoking, COPD on Home O2,    Shortness of breath  COPD exacerbation   Active Tobacco smoking   -c/w Steroids Duo nebs and inhalers   -Counselled on Tobacco cessation c/w Nicotine patch  -Pulmonary consult appreciated: prednisone, prednisone taper  -ID consult appreciated: c/w azithromycin completed  -pt to follow up with pulmonology and MAP medicine clinic at [place].     mild depression / anxiety   - seen by psychiatry , started on Remeron, outpt follow up          A 69 yo female with long term smoking, COPD on Home O2,    Shortness of breath  COPD exacerbation   Active Tobacco smoking   -c/w Steroids Duo nebs and inhalers   -Counselled on Tobacco cessation c/w Nicotine patch  -Pulmonary consult appreciated: prednisone, prednisone taper  -ID consult appreciated: c/w azithromycin completed  -pt to follow up with pulmonology and MAP medicine clinic at [place].     mild depression / anxiety   - seen by psychiatry , started on Remeron, outpt follow up   [facility] OPD Appointment scheduled for 1/30/2025 at 10:30AM at   [address] [place] [address], [id number].   Appointment info given directly to patient.           A 69 yo female with long term smoking, COPD on Home O2,    Shortness of breath  COPD exacerbation   Active Tobacco smoking   -c/w Steroids Duo nebs and inhalers   -Counselled on Tobacco cessation c/w Nicotine patch  -Pulmonary consult appreciated: prednisone, prednisone taper  -pt to follow up with pulmonology and MAP medicine clinic at [place].     mild depression / anxiety   - seen by psychiatry , started on Remeron, outpt follow up   [facility] OPD Appointment scheduled for 1/30/2025 at 10:30AM at   [address] [facility], [address].   Appointment info given directly to patient.           A 67 yo female with long term smoking, COPD on Home O2,    Shortness of breath  COPD exacerbation   Active Tobacco smoking   -c/w Steroids Duo nebs and inhalers   -Counselled on Tobacco cessation c/w Nicotine patch  -Pulmonary consult appreciated: prednisone, prednisone taper  -pt to follow up with pulmonology and MAP medicine clinic at [place].     mild depression / anxiety   - seen by psychiatry , started on Remeron, outpt follow up   [facility] OPD Appointment scheduled for 1/30/2025 at 10:30AM at   [address] [facility], [address].   Appointment info given directly to patient.    script for PT given 3x week

## 2024-12-31 NOTE — BH CONSULTATION LIAISON PROGRESS NOTE - CURRENT MEDICATION
Called patient left message to call office     Sent letter MEDICATIONS  (STANDING):  albuterol/ipratropium for Nebulization 3 milliLiter(s) Nebulizer every 6 hours  mirtazapine 7.5 milliGRAM(s) Oral at bedtime  predniSONE   Tablet 40 milliGRAM(s) Oral daily    MEDICATIONS  (PRN):  polyethylene glycol 3350 17 Gram(s) Oral daily PRN Constipation

## 2024-12-31 NOTE — DISCHARGE NOTE PROVIDER - NSDCCPCAREPLAN_GEN_ALL_CORE_FT
PRINCIPAL DISCHARGE DIAGNOSIS  Diagnosis: COPD exacerbation  Assessment and Plan of Treatment: COPD exacerbation   -you were seen by pulmonology, treated with IV steroids, continue demarco as outpt,  triple inhaled therapy such as Trelegy 200, , follow up with pulmonology  for further management and PFT  -Tobacco cessation counseling  seen by ID consult , treated with IV abx,   -pt to follow up with pulmonology and MAP medicine clinic at [place].         SECONDARY DISCHARGE DIAGNOSES  Diagnosis: Anxiety and depression  Assessment and Plan of Treatment: you were seen by psychiatry , started on remeron follow up as outpt     PRINCIPAL DISCHARGE DIAGNOSIS  Diagnosis: COPD exacerbation  Assessment and Plan of Treatment: COPD exacerbation   -you were seen by pulmonology, treated with IV steroids, continue demarco as outpt,  triple inhaled therapy such as Trelegy 200, , follow up with pulmonology  for further management and PFT  -Tobacco cessation counseling  seen by ID consult , treated with IV abx,   -pt to follow up with pulmonology and MAP medicine clinic at [place].         SECONDARY DISCHARGE DIAGNOSES  Diagnosis: Anxiety and depression  Assessment and Plan of Treatment: you were seen by psychiatry , started on remeron follow up as outpt  [facility] OPD Appointment scheduled for 1/30/2025 at 10:30AM at [address] [place] [address], [address]. Appointment info given directly to patient.       PRINCIPAL DISCHARGE DIAGNOSIS  Diagnosis: COPD exacerbation  Assessment and Plan of Treatment: COPD exacerbation   -you were seen by pulmonology, treated with IV steroids, continue demarco as outpt,  triple inhaled therapy such as Trelegy 200, , follow up with pulmonology  for further management and PFT  -Tobacco cessation counseling  -pt to follow up with pulmonology and MAP medicine clinic at [place].         SECONDARY DISCHARGE DIAGNOSES  Diagnosis: Anxiety and depression  Assessment and Plan of Treatment: you were seen by psychiatry , started on remeron follow up as outpt  [facility] OPD Appointment scheduled for 1/30/2025 at 10:30AM at [address] [place] [address], [address]. Appointment info given directly to patient.       PRINCIPAL DISCHARGE DIAGNOSIS  Diagnosis: COPD exacerbation  Assessment and Plan of Treatment: COPD exacerbation   -you were seen by pulmonology, treated with IV steroids, continue demarco as outpt,  triple inhaled therapy such as Trelegy 200, , follow up with pulmonology  for further management and PFT  -Tobacco cessation counseling  -pt to follow up with pulmonology and MAP medicine clinic at [place] on 1/16/24 at 8:30 am         SECONDARY DISCHARGE DIAGNOSES  Diagnosis: Anxiety and depression  Assessment and Plan of Treatment: you were seen by psychiatry , started on remeron follow up as outpt  [facility] OPD Appointment scheduled for 1/30/2025 at 10:30AM at [address] [place] [address], [id number]. Appointment info given directly to patient.

## 2024-12-31 NOTE — DISCHARGE NOTE PROVIDER - CARE PROVIDER_API CALL
Carlitos Fajardo  Internal Medicine  [address] [facility], Admin - [address]  [place] [address]  Phone: [phone]  Fax: [phone]  Follow Up Time: 1 week    Miguel Moore  Critical Care Medicine  [address] [facility]  [place] [address]  Phone: [phone]  Fax: [phone]  Follow Up Time: 1 week    psychiatry,   [address] [facility], [address]  Phone: [phone]  Fax: (   )    -  Follow Up Time: 1 week   Carlitos Fajardo  Internal Medicine  [address] [facility], Admin - [address]  [place] [address]  Phone: [phone]  Fax: [phone]  Follow Up Time: 1 week    Miguel Moore  Critical Care Medicine  [address] [facility], [address]  [place] [address]  Phone: [phone]  Fax: [phone]  Follow Up Time: 1 week    Psychiatry,   [facility] OPD Appointment scheduled for 1/30/2025 at 10:30AM   at [address] [facility], [address].   Appointment info given directly to patient.  Phone: (   )    -  Fax: (   )    -  Follow Up Time:    Miguel Moore  Critical Care Medicine  [address] [facility], [address]  [place] [address]  Phone: [phone]  Fax: [phone]  Scheduled Appointment: 02/26/2025    Carlitos Fajardo  Internal Medicine  [address] [facility], Admin [address]  [place] [address]  Phone: [phone]  Fax: [phone]  Scheduled Appointment: 01/16/2025 08:30 AM    Psychiatry,   [facility] OPD Appointment scheduled for 1/30/2025 at 10:30AM   at [address] [facility], [address].   Appointment info given directly to patient.  Phone: (   )    -  Fax: (   )    -  Follow Up Time:

## 2024-12-31 NOTE — BH CONSULTATION LIAISON PROGRESS NOTE - NSBHPSYCHOLCOGORIENT_PSY_A_CORE
The urine shows persistent mild protein which comes from the kidneys. To protect the kidneys, proceed with the excellent blood pressure (130/80 or less) and diabetes control (AIC7% or less).  Dr Linares  Oriented to time, place, person, situation

## 2024-12-31 NOTE — DISCHARGE NOTE PROVIDER - NSDCFUSCHEDAPPT_GEN_ALL_CORE_FT
Yusef Moore  [facility]  PULMMED [address]  Scheduled Appointment: 02/26/2025     Carlitos Fajardo  [facility] PreAdmits  Scheduled Appointment: 01/16/2025    Carlitos Fajardo  [facility]  INT[address]  Scheduled Appointment: 01/16/2025    Yusef Moore  [facility]  PULMMED [address]  Scheduled Appointment: 02/26/2025     Carlitos Fajardo  [facility] PreAdmits  Scheduled Appointment: 01/16/2025    Carlitos Fajardo  [facility]  INTMED  [address]  Scheduled Appointment: 01/16/2025    Jose aHnley  [facility] PreAdmits  Scheduled Appointment: 01/30/2025    [facility]  PSYCHIATRY  [address]  Scheduled Appointment: 01/30/2025    Yusef Moore  [facility]  PULMMED [address]  Scheduled Appointment: 02/26/2025

## 2024-12-31 NOTE — DISCHARGE NOTE NURSING/CASE MANAGEMENT/SOCIAL WORK - NSDCPEFALRISK_GEN_ALL_CORE
For information on Fall & Injury Prevention, visit: [email or web address] OR  [email or web address] OR  https://www.cdc.gov/steadi/patient.html

## 2024-12-31 NOTE — DISCHARGE NOTE PROVIDER - CARE PROVIDERS DIRECT ADDRESSES
,[email or web address],DirectAddress_Unknown ,[email or web address],DirectAddress_Unknown

## 2024-12-31 NOTE — DISCHARGE NOTE PROVIDER - ATTENDING DISCHARGE PHYSICAL EXAMINATION:
CONSTITUTIONAL: No acute distress, well-developed, well-groomed, AAOx3  HEAD: Atraumatic, normocephalic  EYES: EOM intact, PERRLA, conjunctiva and sclera clear  PULMONARY: Clear to auscultation bilaterally; no wheezes,  CARDIOVASCULAR: Regular rate and rhythm; no murmurs,   GASTROINTESTINAL: Soft, non-tender, non-distended; bowel sounds present  MUSCULOSKELETAL: 2+ peripheral pulses; no clubbing, no cyanosis, no edema  NEUROLOGY: non-focal  SKIN: No rashes or lesions; warm and dry

## 2024-12-31 NOTE — DISCHARGE NOTE PROVIDER - PROVIDER TOKENS
PROVIDER:[TOKEN:[[id number]],FOLLOWUP:[1 week]],PROVIDER:[TOKEN:[[id number]],FOLLOWUP:[1 week]],FREE:[LAST:[psychiatry],PHONE:[phone]],FAX:[(   )    -],ADDRESS:[[address] [place] [address], [id number]],FOLLOWUP:[1 week]] PROVIDER:[TOKEN:[[id number]],FOLLOWUP:[1 week]],PROVIDER:[TOKEN:[[id number]],FOLLOWUP:[1 week]],FREE:[LAST:[Psychiatry],PHONE:[(   )    -],FAX:[(   )    -],ADDRESS:[[facility] OPD Appointment scheduled for 1/30/2025 at 10:30AM   at [address] [place] [address], [facility].   Appointment info given directly to patient.]] PROVIDER:[TOKEN:[[id number]],SCHEDULEDAPPT:[02/26/2025]],PROVIDER:[TOKEN:[[id number]],SCHEDULEDAPPT:[01/16/2025],SCHEDULEDAPPTTIME:[08:30 AM]],FREE:[LAST:[Psychiatry],PHONE:[(   )    -],FAX:[(   )    -],ADDRESS:[[facility] OPD Appointment scheduled for 1/30/2025 at 10:30AM   at [address] [place] [address], [id number].   Appointment info given directly to patient.]]

## 2024-12-31 NOTE — BH CONSULTATION LIAISON PROGRESS NOTE - NSBHFUPINTERVALHXFT_PSY_A_CORE
Interval chart reviewed, patient seen. Per MAR refused mirtazapine 7.5mg last night. On assessment this morning, patient was forthcoming and states that she changed her mind and decided she does not want to take psych medications right now. States that she is more interested in talk therapy instead unless the meds are "necessary." Patient states that she would like to "get my physical strength, my other medications, all straightened out" first. States that she missed some of her other appointments but wants to give herself "a second chance." Patient elaborates that since her last hospitalization she has been nervous about going to her doctors appointments and "the outcome" as "my two sons are grown and still need me so the thought of anything happening to me frightens me." States that she was also having trouble making the appointments and finding a time slot available. Patient reports that she feels that her eating and sleeping are okay. She denies any passive or active SI/plan/intent. She denies any hallucinations. She denies any thoughts of harming others or homicidal ideation.     She provides a different number for son Ritchie to be contacted . She also continues to be interested in an outpatient psych appointment at [facility]. States that if she is worse she will consider medications "but I'm not at that point."

## 2024-12-31 NOTE — DISCHARGE NOTE NURSING/CASE MANAGEMENT/SOCIAL WORK - PATIENT PORTAL LINK FT
You can access the FollowMyHealth Patient Portal offered by [facility] by registering at the following website: [email or web address]. By joining [employer]’s FollowMyHealth portal, you will also be able to view your health information using other applications (apps) compatible with our system.

## 2024-12-31 NOTE — CHART NOTE - NSCHARTNOTEFT_GEN_A_CORE
patient refusing iv solu medrol, requesting po steroids.   order changed to prednisone 40mg qd per pulm consult guidance
pt reports feels depressed, denies SI/HI  spoke with psychiatry to see pt   attending aware
CM attempted to reach patient's son Ritchie at [phone] for collateral however they were unavailable, left  requesting call back.    [facility] OPD Appointment scheduled for 1/30/2025 at 10:30AM at [address] [facility], [address]. Appointment info given directly to patient.
NAME: Ritchie  NUMBER: [phone]  RELATIONSHIP: Son  COMMENTS: BTCM made several attempts to contact patient's son however they were unavailable and there was no voicemail option
To Whom it may Concern:    Ching Samantha, was admitted at [facility] from 12/29/24- 12/31/24.    She is cleared to work from home on nasal cannula oxygen.     If needs to work from Outside Home to follow up with PCP for clearance.

## 2025-01-16 ENCOUNTER — APPOINTMENT (OUTPATIENT)
Dept: INTERNAL MEDICINE | Facility: CLINIC | Age: 70
End: 2025-01-16

## 2025-01-16 ENCOUNTER — OUTPATIENT (OUTPATIENT)
Dept: OUTPATIENT SERVICES | Facility: HOSPITAL | Age: 70
LOS: 1 days | End: 2025-01-16
Payer: COMMERCIAL

## 2025-01-16 ENCOUNTER — NON-APPOINTMENT (OUTPATIENT)
Age: 70
End: 2025-01-16

## 2025-01-16 VITALS
HEART RATE: 70 BPM | WEIGHT: 152 LBS | OXYGEN SATURATION: 93 % | SYSTOLIC BLOOD PRESSURE: 163 MMHG | DIASTOLIC BLOOD PRESSURE: 94 MMHG | TEMPERATURE: 97.9 F | HEIGHT: 67 IN | BODY MASS INDEX: 23.86 KG/M2

## 2025-01-16 DIAGNOSIS — Z98.890 OTHER SPECIFIED POSTPROCEDURAL STATES: Chronic | ICD-10-CM

## 2025-01-16 DIAGNOSIS — J96.11 CHRONIC RESPIRATORY FAILURE WITH HYPOXIA: ICD-10-CM

## 2025-01-16 DIAGNOSIS — J44.9 CHRONIC OBSTRUCTIVE PULMONARY DISEASE, UNSPECIFIED: ICD-10-CM

## 2025-01-16 DIAGNOSIS — Z92.89 PERSONAL HISTORY OF OTHER MEDICAL TREATMENT: ICD-10-CM

## 2025-01-16 DIAGNOSIS — Z00.00 ENCOUNTER FOR GENERAL ADULT MEDICAL EXAMINATION W/OUT ABNORMAL FINDINGS: ICD-10-CM

## 2025-01-16 DIAGNOSIS — F32.A DEPRESSION, UNSPECIFIED: ICD-10-CM

## 2025-01-16 DIAGNOSIS — Z99.81 CHRONIC RESPIRATORY FAILURE WITH HYPOXIA: ICD-10-CM

## 2025-01-16 DIAGNOSIS — I10 ESSENTIAL (PRIMARY) HYPERTENSION: ICD-10-CM

## 2025-01-16 DIAGNOSIS — R91.8 OTHER NONSPECIFIC ABNORMAL FINDING OF LUNG FIELD: ICD-10-CM

## 2025-01-16 DIAGNOSIS — Z00.00 ENCOUNTER FOR GENERAL ADULT MEDICAL EXAMINATION WITHOUT ABNORMAL FINDINGS: ICD-10-CM

## 2025-01-16 PROCEDURE — 99214 OFFICE O/P EST MOD 30 MIN: CPT

## 2025-01-16 PROCEDURE — 99204 OFFICE O/P NEW MOD 45 MIN: CPT

## 2025-01-16 RX ORDER — ALBUTEROL 90 MCG
90 AEROSOL (GRAM) INHALATION
Refills: 0 | Status: ACTIVE | COMMUNITY

## 2025-01-16 RX ORDER — FLUTICASONE FUROATE, UMECLIDINIUM BROMIDE AND VILANTEROL TRIFENATATE 200; 62.5; 25 UG/1; UG/1; UG/1
200-62.5-25 POWDER RESPIRATORY (INHALATION) DAILY
Qty: 3 | Refills: 3 | Status: ACTIVE | COMMUNITY
Start: 2025-01-16 | End: 1900-01-01

## 2025-01-16 RX ORDER — FLUTICASONE FUROATE, UMECLIDINIUM BROMIDE AND VILANTEROL TRIFENATATE 200; 62.5; 25 UG/1; UG/1; UG/1
200-62.5-25 POWDER RESPIRATORY (INHALATION)
Refills: 0 | Status: DISCONTINUED | COMMUNITY
End: 2025-01-16

## 2025-01-16 RX ORDER — AMLODIPINE BESYLATE 5 MG/1
5 TABLET ORAL DAILY
Qty: 90 | Refills: 3 | Status: ACTIVE | COMMUNITY
Start: 2025-01-16 | End: 1900-01-01

## 2025-01-29 DIAGNOSIS — Z92.89 PERSONAL HISTORY OF OTHER MEDICAL TREATMENT: ICD-10-CM

## 2025-01-29 DIAGNOSIS — R91.8 OTHER NONSPECIFIC ABNORMAL FINDING OF LUNG FIELD: ICD-10-CM

## 2025-01-29 DIAGNOSIS — F32.A DEPRESSION, UNSPECIFIED: ICD-10-CM

## 2025-01-29 DIAGNOSIS — J96.11 CHRONIC RESPIRATORY FAILURE WITH HYPOXIA: ICD-10-CM

## 2025-01-29 DIAGNOSIS — Z00.00 ENCOUNTER FOR GENERAL ADULT MEDICAL EXAMINATION WITHOUT ABNORMAL FINDINGS: ICD-10-CM

## 2025-01-29 DIAGNOSIS — J44.9 CHRONIC OBSTRUCTIVE PULMONARY DISEASE, UNSPECIFIED: ICD-10-CM

## 2025-01-29 DIAGNOSIS — I10 ESSENTIAL (PRIMARY) HYPERTENSION: ICD-10-CM

## 2025-01-30 ENCOUNTER — APPOINTMENT (OUTPATIENT)
Dept: PSYCHIATRY | Facility: CLINIC | Age: 70
End: 2025-01-30

## 2025-03-03 ENCOUNTER — APPOINTMENT (OUTPATIENT)
Facility: CLINIC | Age: 70
End: 2025-03-03

## 2025-05-29 ENCOUNTER — APPOINTMENT (OUTPATIENT)
Facility: CLINIC | Age: 70
End: 2025-05-29

## 2025-06-02 ENCOUNTER — APPOINTMENT (OUTPATIENT)
Dept: ORTHOPEDIC SURGERY | Facility: CLINIC | Age: 70
End: 2025-06-02
Payer: COMMERCIAL

## 2025-06-02 DIAGNOSIS — M72.0 PALMAR FASCIAL FIBROMATOSIS [DUPUYTREN]: ICD-10-CM

## 2025-06-02 DIAGNOSIS — S69.92XA UNSPECIFIED INJURY OF LEFT WRIST, HAND AND FINGER(S), INITIAL ENCOUNTER: ICD-10-CM

## 2025-06-02 PROCEDURE — 73140 X-RAY EXAM OF FINGER(S): CPT | Mod: LT

## 2025-06-02 PROCEDURE — 99203 OFFICE O/P NEW LOW 30 MIN: CPT

## 2025-06-06 ENCOUNTER — NON-APPOINTMENT (OUTPATIENT)
Age: 70
End: 2025-06-06

## 2025-06-24 ENCOUNTER — APPOINTMENT (OUTPATIENT)
Dept: INTERNAL MEDICINE | Facility: CLINIC | Age: 70
End: 2025-06-24

## 2025-06-30 ENCOUNTER — NON-APPOINTMENT (OUTPATIENT)
Age: 70
End: 2025-06-30

## 2025-07-01 ENCOUNTER — APPOINTMENT (OUTPATIENT)
Facility: CLINIC | Age: 70
End: 2025-07-01
Payer: COMMERCIAL

## 2025-07-01 VITALS
DIASTOLIC BLOOD PRESSURE: 87 MMHG | WEIGHT: 155 LBS | OXYGEN SATURATION: 97 % | HEART RATE: 114 BPM | SYSTOLIC BLOOD PRESSURE: 145 MMHG | HEIGHT: 67 IN | BODY MASS INDEX: 24.33 KG/M2

## 2025-07-01 PROCEDURE — 99204 OFFICE O/P NEW MOD 45 MIN: CPT | Mod: 25

## 2025-07-01 PROCEDURE — G0296 VISIT TO DETERM LDCT ELIG: CPT

## 2025-07-01 PROCEDURE — 99406 BEHAV CHNG SMOKING 3-10 MIN: CPT

## 2025-07-01 RX ORDER — IPRATROPIUM BROMIDE AND ALBUTEROL SULFATE 2.5; .5 MG/3ML; MG/3ML
0.5-2.5 (3) SOLUTION RESPIRATORY (INHALATION)
Qty: 1 | Refills: 3 | Status: ACTIVE | COMMUNITY
Start: 2025-07-01 | End: 1900-01-01

## 2025-07-14 ENCOUNTER — APPOINTMENT (OUTPATIENT)
Dept: ORTHOPEDIC SURGERY | Facility: CLINIC | Age: 70
End: 2025-07-14

## 2025-07-31 ENCOUNTER — APPOINTMENT (OUTPATIENT)
Dept: PULMONOLOGY | Facility: HOSPITAL | Age: 70
End: 2025-07-31
Payer: COMMERCIAL

## 2025-07-31 ENCOUNTER — OUTPATIENT (OUTPATIENT)
Dept: OUTPATIENT SERVICES | Facility: HOSPITAL | Age: 70
LOS: 1 days | End: 2025-07-31
Payer: COMMERCIAL

## 2025-07-31 DIAGNOSIS — Z98.890 OTHER SPECIFIED POSTPROCEDURAL STATES: Chronic | ICD-10-CM

## 2025-07-31 DIAGNOSIS — J44.9 CHRONIC OBSTRUCTIVE PULMONARY DISEASE, UNSPECIFIED: ICD-10-CM

## 2025-07-31 PROCEDURE — 94664 DEMO&/EVAL PT USE INHALER: CPT

## 2025-07-31 PROCEDURE — 94060 EVALUATION OF WHEEZING: CPT | Mod: 26

## 2025-07-31 PROCEDURE — 94729 DIFFUSING CAPACITY: CPT | Mod: 26

## 2025-07-31 PROCEDURE — 94726 PLETHYSMOGRAPHY LUNG VOLUMES: CPT

## 2025-07-31 PROCEDURE — 94727 GAS DIL/WSHOT DETER LNG VOL: CPT | Mod: 26

## 2025-07-31 PROCEDURE — 94729 DIFFUSING CAPACITY: CPT

## 2025-07-31 PROCEDURE — 94070 EVALUATION OF WHEEZING: CPT

## 2025-08-01 DIAGNOSIS — J44.9 CHRONIC OBSTRUCTIVE PULMONARY DISEASE, UNSPECIFIED: ICD-10-CM

## 2025-08-18 ENCOUNTER — APPOINTMENT (OUTPATIENT)
Facility: CLINIC | Age: 70
End: 2025-08-18

## 2025-09-02 ENCOUNTER — APPOINTMENT (OUTPATIENT)
Facility: CLINIC | Age: 70
End: 2025-09-02

## 2025-09-15 ENCOUNTER — APPOINTMENT (OUTPATIENT)
Dept: INTERNAL MEDICINE | Facility: CLINIC | Age: 70
End: 2025-09-15